# Patient Record
Sex: MALE | Race: WHITE | NOT HISPANIC OR LATINO | Employment: FULL TIME | URBAN - METROPOLITAN AREA
[De-identification: names, ages, dates, MRNs, and addresses within clinical notes are randomized per-mention and may not be internally consistent; named-entity substitution may affect disease eponyms.]

---

## 2022-01-11 ENCOUNTER — OFFICE VISIT (OUTPATIENT)
Dept: FAMILY MEDICINE CLINIC | Facility: CLINIC | Age: 52
End: 2022-01-11
Payer: COMMERCIAL

## 2022-01-11 VITALS
HEIGHT: 68 IN | BODY MASS INDEX: 45.16 KG/M2 | DIASTOLIC BLOOD PRESSURE: 70 MMHG | HEART RATE: 73 BPM | RESPIRATION RATE: 18 BRPM | TEMPERATURE: 97.5 F | WEIGHT: 298 LBS | OXYGEN SATURATION: 96 % | SYSTOLIC BLOOD PRESSURE: 124 MMHG

## 2022-01-11 DIAGNOSIS — Z13.83 SCREENING FOR CARDIOVASCULAR, RESPIRATORY, AND GENITOURINARY DISEASES: ICD-10-CM

## 2022-01-11 DIAGNOSIS — Z99.89 OSA ON CPAP: ICD-10-CM

## 2022-01-11 DIAGNOSIS — Z13.1 SCREENING FOR DIABETES MELLITUS (DM): ICD-10-CM

## 2022-01-11 DIAGNOSIS — G47.33 OSA ON CPAP: ICD-10-CM

## 2022-01-11 DIAGNOSIS — Z13.6 SCREENING FOR CARDIOVASCULAR, RESPIRATORY, AND GENITOURINARY DISEASES: ICD-10-CM

## 2022-01-11 DIAGNOSIS — Z12.5 PROSTATE CANCER SCREENING: ICD-10-CM

## 2022-01-11 DIAGNOSIS — Z13.89 SCREENING FOR CARDIOVASCULAR, RESPIRATORY, AND GENITOURINARY DISEASES: ICD-10-CM

## 2022-01-11 DIAGNOSIS — Z00.00 HEALTHCARE MAINTENANCE: Primary | ICD-10-CM

## 2022-01-11 DIAGNOSIS — E66.01 MORBID OBESITY (HCC): ICD-10-CM

## 2022-01-11 DIAGNOSIS — U07.1 COVID-19: ICD-10-CM

## 2022-01-11 PROCEDURE — 3008F BODY MASS INDEX DOCD: CPT | Performed by: FAMILY MEDICINE

## 2022-01-11 PROCEDURE — 3725F SCREEN DEPRESSION PERFORMED: CPT | Performed by: FAMILY MEDICINE

## 2022-01-11 PROCEDURE — 99386 PREV VISIT NEW AGE 40-64: CPT | Performed by: FAMILY MEDICINE

## 2022-01-11 NOTE — PROGRESS NOTES
Assessment/Plan:    No problem-specific Assessment & Plan notes found for this encounter  cpe today     Diagnoses and all orders for this visit:    Healthcare maintenance    COVID-19    Screening for cardiovascular, respiratory, and genitourinary diseases  -     Lipid Panel with Direct LDL reflex; Future    Screening for diabetes mellitus (DM)  -     Comprehensive metabolic panel; Future    Prostate cancer screening  -     PSA, Total Screen; Future    BMI 45 0-49 9, adult (HCC)    Morbid obesity (HCC)  -     TSH, 3rd generation; Future  -     Testosterone; Future    AGATHA on CPAP  -     Ambulatory referral to Sleep Medicine; Future              Return if symptoms worsen or fail to improve  Subjective:      Patient ID: Reva Harden is a 54 y o  male  Chief Complaint   Patient presents with    np establish     jmcma       HPI  Tested at [facility], pos for covid  1st day of sx 1/2/22  Headache was symptom  Works at [employer] in [place] as   Told test was positive, rapid pos and pcr positive, reported 1/3/22    Home test done yesterday    From [place]  Here for 3-4y  Has AGATHA on CPAP    Last labs 3y ago    No prior colonoscopy    Been off work  Out since 1/3/22    No covid vax yet, plans to    Skin feeling better now    Diet: no restrictions  Some keto for a while  Loss then some gain  Always hard to lose weight    The following portions of the patient's history were reviewed and updated as appropriate: allergies, current medications, past family history, past medical history, past social history, past surgical history and problem list     Review of Systems   Respiratory: Negative for shortness of breath and wheezing  No current outpatient medications on file  No current facility-administered medications for this visit         Objective:    /70   Pulse 73   Temp 97 5 °F (36 4 °C)   Resp 18   Ht 5' 8" (1 727 m)   Wt 135 kg (298 lb)   SpO2 96%   BMI 45 31 kg/m²        Physical Exam  Vitals and nursing note reviewed  Constitutional:       Appearance: He is well-developed  He is obese  He is not ill-appearing  HENT:      Head: Normocephalic  Right Ear: Tympanic membrane normal       Left Ear: Tympanic membrane normal       Mouth/Throat:      Pharynx: No oropharyngeal exudate  Eyes:      General: No scleral icterus  Conjunctiva/sclera: Conjunctivae normal    Neck:      Vascular: No carotid bruit  Cardiovascular:      Rate and Rhythm: Normal rate and regular rhythm  Heart sounds: No murmur heard  Pulmonary:      Effort: Pulmonary effort is normal  No respiratory distress  Breath sounds: No wheezing  Abdominal:      General: There is no distension  Palpations: Abdomen is soft  Tenderness: There is no abdominal tenderness  Musculoskeletal:         General: No deformity  Cervical back: Neck supple  Skin:     General: Skin is warm and dry  Coloration: Skin is not pale  Neurological:      Mental Status: He is alert  Motor: No weakness  Gait: Gait normal    Psychiatric:         Mood and Affect: Mood normal          Behavior: Behavior normal          Thought Content: Thought content normal          BMI Counseling: Body mass index is 45 31 kg/m²  The BMI is above normal  Nutrition recommendations include decreasing portion sizes and moderation in carbohydrate intake  Exercise recommendations include exercising 3-5 times per week  No pharmacotherapy was ordered  Rationale for BMI follow-up plan is due to patient being overweight or obese  Depression Screening and Follow-up Plan: Patient was screened for depression during today's encounter  They screened negative with a PHQ-2 score of 0             Jonathan Moore DO

## 2022-01-11 NOTE — LETTER
January 11, 2022     Patient: Yoli Mckeon   YOB: 1970   Date of Visit: 1/11/2022       To Whom it May Concern:    Yoli Mckeon is under my professional care  He was seen in my office on 1/11/2022  May return to work on 1/13/22  If you have any questions or concerns, please don't hesitate to call           Sincerely,          Jennifer Esparza DO        CC: No Recipients

## 2022-01-12 PROBLEM — Z13.6 SCREENING FOR CARDIOVASCULAR, RESPIRATORY, AND GENITOURINARY DISEASES: Status: ACTIVE | Noted: 2022-01-12

## 2022-01-12 PROBLEM — Z13.89 SCREENING FOR CARDIOVASCULAR, RESPIRATORY, AND GENITOURINARY DISEASES: Status: ACTIVE | Noted: 2022-01-12

## 2022-01-12 PROBLEM — Z13.1 SCREENING FOR DIABETES MELLITUS (DM): Status: ACTIVE | Noted: 2022-01-12

## 2022-01-12 PROBLEM — Z13.83 SCREENING FOR CARDIOVASCULAR, RESPIRATORY, AND GENITOURINARY DISEASES: Status: ACTIVE | Noted: 2022-01-12

## 2022-02-05 LAB
ALBUMIN SERPL-MCNC: 4.5 G/DL (ref 4–5)
ALBUMIN/GLOB SERPL: 1.7 {RATIO} (ref 1.2–2.2)
ALP SERPL-CCNC: 86 IU/L (ref 44–121)
ALT SERPL-CCNC: 41 IU/L (ref 0–44)
AST SERPL-CCNC: 32 IU/L (ref 0–40)
BILIRUB SERPL-MCNC: 0.6 MG/DL (ref 0–1.2)
BUN SERPL-MCNC: 14 MG/DL (ref 6–24)
BUN/CREAT SERPL: 13 (ref 9–20)
CALCIUM SERPL-MCNC: 9.5 MG/DL (ref 8.7–10.2)
CHLORIDE SERPL-SCNC: 100 MMOL/L (ref 96–106)
CHOLEST SERPL-MCNC: 171 MG/DL (ref 100–199)
CO2 SERPL-SCNC: 23 MMOL/L (ref 20–29)
CREAT SERPL-MCNC: 1.07 MG/DL (ref 0.76–1.27)
GLOBULIN SER-MCNC: 2.7 G/DL (ref 1.5–4.5)
GLUCOSE SERPL-MCNC: 96 MG/DL (ref 65–99)
HDLC SERPL-MCNC: 49 MG/DL
LDLC SERPL CALC-MCNC: 101 MG/DL (ref 0–99)
MICRODELETION SYND BLD/T FISH: NORMAL
POTASSIUM SERPL-SCNC: 4.5 MMOL/L (ref 3.5–5.2)
PROT SERPL-MCNC: 7.2 G/DL (ref 6–8.5)
PSA SERPL-MCNC: 0.5 NG/ML (ref 0–4)
SL AMB EGFR AFRICAN AMERICAN: 94 ML/MIN/1.73
SL AMB EGFR NON AFRICAN AMERICAN: 81 ML/MIN/1.73
SODIUM SERPL-SCNC: 139 MMOL/L (ref 134–144)
TRIGL SERPL-MCNC: 116 MG/DL (ref 0–149)
TSH SERPL DL<=0.005 MIU/L-ACNC: 1.98 UIU/ML (ref 0.45–4.5)

## 2022-02-06 LAB — TESTOST SERPL-MCNC: 324 NG/DL (ref 264–916)

## 2022-02-23 ENCOUNTER — OFFICE VISIT (OUTPATIENT)
Dept: SLEEP CENTER | Facility: CLINIC | Age: 52
End: 2022-02-23
Payer: COMMERCIAL

## 2022-02-23 VITALS
WEIGHT: 302 LBS | BODY MASS INDEX: 45.77 KG/M2 | HEART RATE: 74 BPM | SYSTOLIC BLOOD PRESSURE: 132 MMHG | HEIGHT: 68 IN | DIASTOLIC BLOOD PRESSURE: 78 MMHG

## 2022-02-23 DIAGNOSIS — E66.01 MORBID OBESITY (HCC): ICD-10-CM

## 2022-02-23 DIAGNOSIS — G47.33 OSA ON CPAP: Primary | ICD-10-CM

## 2022-02-23 DIAGNOSIS — G47.19 EXCESSIVE DAYTIME SLEEPINESS: ICD-10-CM

## 2022-02-23 DIAGNOSIS — F51.12 INSUFFICIENT SLEEP SYNDROME: ICD-10-CM

## 2022-02-23 DIAGNOSIS — Z99.89 OSA ON CPAP: Primary | ICD-10-CM

## 2022-02-23 PROCEDURE — 99204 OFFICE O/P NEW MOD 45 MIN: CPT | Performed by: INTERNAL MEDICINE

## 2022-02-23 PROCEDURE — 3008F BODY MASS INDEX DOCD: CPT | Performed by: INTERNAL MEDICINE

## 2022-02-23 PROCEDURE — 1036F TOBACCO NON-USER: CPT | Performed by: INTERNAL MEDICINE

## 2022-02-23 NOTE — PROGRESS NOTES
Review of Systems      Genitourinary none   Cardiology ankle/leg swelling   Gastrointestinal none   Neurology numbness/tingling of an extremity   Constitutional claustrophobia   Integumentary none   Psychiatry none   Musculoskeletal none   Pulmonary shortness of breath with activity, wheezing and snoring   ENT throat clearing   Endocrine none   Hematological none

## 2022-02-23 NOTE — PROGRESS NOTES
Consultation - Sleep Center   Byron Braun  54 y o  male  :1970  XS  DOS:2022    Physician Requesting Consult: Presley Colin DO             Reason for Consult : [At your kind request] I saw Byron Braun for initial sleep evaluation today  He was diagnosed with obstructive sleep apnea around 10 years ago and prescribed BiPAP  He is using a dream Station 1 machine and needs a replacement  He has studies were done in [place] and results are no longer available  PFSH, Problem List, Medications & Allergies were reviewed in EMR  Kamille Parker  has no past medical history on file  He currently has no medications in their medication list       HPI:  He is using BiPAP regularly and benefits from use  He is experiencing no adverse effects  Is no longer snoring or having breathing difficulties with use  Patient reports has [not] been using [employer] to sanitize the machine  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  Other Complaints: [none]  Restless Leg Syndrome: reports no suggestive symptoms  Parasomnia: no features reported    Sleep Routine (on average): [ ] Typical Bedtime:  1:00 a m  Gets OOB:  6:30 a m  TIB:5 5 hrs  Sleep latency:< 15 minutes Sleep Interruptions:infrequent/nite [because of nocturia and is able to fall back asleep]  Awakens: with aid of an alarm  Upon awakening: feels refreshed[ ]  Kamille Parker reports [Daytime] Sleepiness [feels drowsy [in the afternoons] and feels like napping but avoids][ ]  He rated [himself] at Total score: 10 /24 on the [place] Sleepiness Scale  Habits:  reports that he has quit smoking  He has never used smokeless tobacco [ ]; [  E-Cigarette/Vaping   ][ ]; [ reports no history of drug use ];  reports current alcohol use  [ ]; Caffeine use:moderate[ ]; Exercise routine: none but is physically active in his job  Family History:  Brother has obstructive sleep apnea  ROS - reviewed and as attached   [Significant for intentional weight loss of around 30 lb in the past 6 months  He reports throat clearing and occasional wheezing]  He has some dyspnea on effort  He reported no cardiac symptoms  EXAM:  /78   Pulse 74   Ht 5' 8" (1 727 m)   Wt (!) 137 kg (302 lb)   BMI 45 92 kg/m²    General: [Well] groomed male, well appearing, in [no apparent] distress  Neurological: Alert and orientated; cooperative; Cranial nerves intact;    Psychiatric: Speech:[clear and coherent]; Normal mood, affect & thought   Skin: [warm and dry]; Color& Hydration [good]; [no] facial rashes or lesions   HEENT:  Craniofacial anatomy: maxillary hypoplasia Sinuses: [non-] tender  TMJ: [Normal]    Eyes: EOM's [intact];[ ] conjunctiva/corneas clear   Ears: Externally[appear normal]     Nasal Airway: narrow nares Septum:[intact]; Mucosa: [normal]; Turbinates: [normal]; Rhinorrhea: [None]   Mouth: Lips: [normal posture]; Dentition: normal   Mucosa:[moist] [ ]; Hard Palate:short    Oropharryx: crowded and AP narrowing Tongue: Mallampati:Class IV, Mobile and MacroglossiaSoft Palate:  redundant  Tonsils: absent  Neck:[is thick and excess fatty tissue;] [Neck Circumference: 20 ";] Supple; [no] abnormal masses; Thyroid:[normal]  Trachea:[central]  Lymph: [No] Cervical [or] Submandibular Lymhadenopathy  Heart: S1,S2 normal; [RRR]; [no] gallop; [no] murmur[s]   Lungs: Respiratory Effort:[normal]  Air entry [good] [bilaterally]  [No] wheezes  [No] rales  Abdomen: [Obese,] Soft & non-tender    Extremities: [No] pedal edema  [No] clubbing or cyanosis  Musculoskeletal:  Motor [normal]; Gait:[normal]  Last CMP in February of this year was normal   There were no results of a CBC    IMPRESSION: Primary/Secondary Sleep Diagnoses (to Medical or Psych conditions) & Comorbidities   1  AGATHA on CPAP  Ambulatory referral to Sleep Medicine    Split Study   2  Excessive daytime sleepiness     3  Insufficient sleep syndrome     4   Morbid obesity ([address] )          PLAN: With respect to above conditions, comprehensive counseling provided on pathophysiology; effects on symptoms and comorbidities, diagnostic strategies & limitations; treatment options; risks or no treament; risks & benefits of the various therapeutic options; costs and insurance aspects  I advised avoiding sleeping supine, using alcohol or sedating medications close to bed time [and on safe driving practices]  Repeat Nocturnal polysomnography is indicated because results are no longer available, his pressure settings are not known and he has ongoing daytime drowsiness  Since he is willing to continue using Pap,  a split study will be scheduled  Home sleep testing is contraindicated because High risk for sleep-related hypoventilation  He was encouraged to persist with efforts at weight reduction  I also advised allowing sufficient opportunity for sleep  Follow-up to be scheduled [after the studies] to review results, further details of treatment options and to [initiate/]adjust therapy  Sincerely,     Authenticated electronically by Jamaal Pack MD   on 93/89/31   Board Certified Specialist     Portions of the record may have been created with voice recognition software  Occasional wrong word or "sound a like" substitutions may have occurred due to the inherent limitations of voice recognition software  There may also be notations and random deletions of words or characters from malfunctioning software  Read the chart carefully and recognize, using context, where substitutions/deletions have occurred

## 2022-02-23 NOTE — PATIENT INSTRUCTIONS

## 2022-03-09 ENCOUNTER — HOSPITAL ENCOUNTER (OUTPATIENT)
Dept: SLEEP CENTER | Facility: CLINIC | Age: 52
Discharge: HOME | End: 2022-03-09
Payer: COMMERCIAL

## 2022-03-09 DIAGNOSIS — G47.33 OSA ON CPAP: ICD-10-CM

## 2022-03-09 DIAGNOSIS — Z99.89 OSA ON CPAP: ICD-10-CM

## 2022-03-09 PROCEDURE — 95811 POLYSOM 6/>YRS CPAP 4/> PARM: CPT

## 2022-03-10 NOTE — PROGRESS NOTES
Sleep Study Documentation    Pre-Sleep Study       Sleep testing procedure explained to patient:YES    Patient napped prior to study:NO    [address] [place] worker after 12PM   Caffeine use:NO    Alcohol:Dayshift workers after 5PM: Alcohol use:NO    Typical day for patient:YES       Study Documentation    Sleep Study Indications: Requalify for new machine    Sleep Study: Split Optimal PAP pressure: 19/15  Leak:Medium  Snore:Eliminated  REM Obtained:yes  Supplemental O2: no    Minimum SaO2 85  Baseline SaO2 95  PAP mask tried (list all)Dreamwear  PAP mask choice (final)Dreamwear  PAP mask type:full face  PAP pressure at which snoring was eliminated 14  Minimum SaO2 at final PAP pressure 90  Mode of Therapy:BiPAP  ETCO2:No  CPAP changed to BiPAP:Yes  If yes why Better comfort and tolerance due to need for increased Pressure    Mode of Therapy:BiPAP    EKG abnormalities: no     EEG abnormalities: no    Sleep Study Recorded < 2 hours: N/A    Sleep Study Recorded > 2 hours but incomplete study: N/A    Sleep Study Recorded 6 hours but no sleep obtained: NO    Patient classification: employed       Post-Sleep Study    Medication used at bedtime or during sleep study:NO    Patient reports time it took to fall asleep:20 to 30 minutes    Patient reports waking up during study:3 or more times  Patient reports returning to sleep in 10 to 30 minutes  Patient reports sleeping 2 to 4 hours without dreaming  Patient reports sleep during study:worse than usual    Patient rated sleepiness: Somewhat sleepy or tired    PAP treatment:yes: Post PAP treatment patient reports feeling unsure if a change is noted and  would wear PAP mask at home

## 2022-03-11 ENCOUNTER — TELEPHONE (OUTPATIENT)
Dept: SLEEP CENTER | Facility: CLINIC | Age: 52
End: 2022-03-11

## 2022-03-11 NOTE — TELEPHONE ENCOUNTER
Called patient and advised sleep study resulted and shows severe AGATHA  APAP ordered  Rescheduled DME set up to 3/29/22 in [place]  Appointment information emailed to [facility]  Patient has compliance follow up 6/29/22

## 2022-03-25 ENCOUNTER — TELEPHONE (OUTPATIENT)
Dept: SLEEP CENTER | Facility: CLINIC | Age: 52
End: 2022-03-25

## 2022-03-25 NOTE — TELEPHONE ENCOUNTER
Called the patient and informed him per the notes, he's benefiting from his Machine now, due to lack of inventory we're not giving out replacement machines  Canceled his appointment

## 2022-10-12 PROBLEM — Z13.83 SCREENING FOR CARDIOVASCULAR, RESPIRATORY, AND GENITOURINARY DISEASES: Status: RESOLVED | Noted: 2022-01-12 | Resolved: 2022-10-12

## 2022-10-12 PROBLEM — Z13.1 SCREENING FOR DIABETES MELLITUS (DM): Status: RESOLVED | Noted: 2022-01-12 | Resolved: 2022-10-12

## 2022-10-12 PROBLEM — Z13.6 SCREENING FOR CARDIOVASCULAR, RESPIRATORY, AND GENITOURINARY DISEASES: Status: RESOLVED | Noted: 2022-01-12 | Resolved: 2022-10-12

## 2022-10-12 PROBLEM — Z13.89 SCREENING FOR CARDIOVASCULAR, RESPIRATORY, AND GENITOURINARY DISEASES: Status: RESOLVED | Noted: 2022-01-12 | Resolved: 2022-10-12

## 2022-10-12 PROBLEM — Z00.00 HEALTHCARE MAINTENANCE: Status: RESOLVED | Noted: 2022-01-11 | Resolved: 2022-10-12

## 2022-10-12 PROBLEM — Z12.5 PROSTATE CANCER SCREENING: Status: RESOLVED | Noted: 2022-01-11 | Resolved: 2022-10-12

## 2023-08-20 ENCOUNTER — APPOINTMENT (EMERGENCY)
Dept: RADIOLOGY | Facility: HOSPITAL | Age: 53
End: 2023-08-20
Payer: COMMERCIAL

## 2023-08-20 ENCOUNTER — HOSPITAL ENCOUNTER (EMERGENCY)
Facility: HOSPITAL | Age: 53
Discharge: HOME | End: 2023-08-20
Attending: EMERGENCY MEDICINE | Admitting: EMERGENCY MEDICINE
Payer: COMMERCIAL

## 2023-08-20 VITALS
DIASTOLIC BLOOD PRESSURE: 52 MMHG | RESPIRATION RATE: 18 BRPM | TEMPERATURE: 97.4 F | HEART RATE: 58 BPM | OXYGEN SATURATION: 95 % | SYSTOLIC BLOOD PRESSURE: 108 MMHG

## 2023-08-20 DIAGNOSIS — N20.0 KIDNEY STONE: Primary | ICD-10-CM

## 2023-08-20 PROBLEM — K76.0 FATTY LIVER: Status: ACTIVE | Noted: 2023-08-20

## 2023-08-20 LAB
ALBUMIN SERPL BCP-MCNC: 4.1 G/DL (ref 3.5–5)
ALP SERPL-CCNC: 70 U/L (ref 34–104)
ALT SERPL W P-5'-P-CCNC: 43 U/L (ref 7–52)
ANION GAP SERPL CALCULATED.3IONS-SCNC: 8 MMOL/L
AST SERPL W P-5'-P-CCNC: 30 U/L (ref 13–39)
BACTERIA UR QL AUTO: ABNORMAL /HPF
BASOPHILS # BLD AUTO: 0.06 THOUSANDS/ÂΜL (ref 0–0.1)
BASOPHILS NFR BLD AUTO: 1 % (ref 0–1)
BILIRUB SERPL-MCNC: 0.82 MG/DL (ref 0.2–1)
BILIRUB UR QL STRIP: ABNORMAL
BUN SERPL-MCNC: 14 MG/DL (ref 5–25)
CALCIUM SERPL-MCNC: 9 MG/DL (ref 8.4–10.2)
CHLORIDE SERPL-SCNC: 102 MMOL/L (ref 96–108)
CLARITY UR: ABNORMAL
CO2 SERPL-SCNC: 25 MMOL/L (ref 21–32)
COLOR UR: YELLOW
CREAT SERPL-MCNC: 1.21 MG/DL (ref 0.6–1.3)
EOSINOPHIL # BLD AUTO: 0.5 THOUSAND/ÂΜL (ref 0–0.61)
EOSINOPHIL NFR BLD AUTO: 6 % (ref 0–6)
ERYTHROCYTE [DISTWIDTH] IN BLOOD BY AUTOMATED COUNT: 12.6 % (ref 11.6–15.1)
GFR SERPL CREATININE-BSD FRML MDRD: 69 ML/MIN/1.73SQ M
GLUCOSE SERPL-MCNC: 156 MG/DL (ref 65–140)
GLUCOSE UR STRIP-MCNC: NEGATIVE MG/DL
HCT VFR BLD AUTO: 43.6 % (ref 36.5–49.3)
HGB BLD-MCNC: 14.5 G/DL (ref 12–17)
HGB UR QL STRIP.AUTO: ABNORMAL
IMM GRANULOCYTES # BLD AUTO: 0.04 THOUSAND/UL (ref 0–0.2)
IMM GRANULOCYTES NFR BLD AUTO: 1 % (ref 0–2)
KETONES UR STRIP-MCNC: NEGATIVE MG/DL
LEUKOCYTE ESTERASE UR QL STRIP: NEGATIVE
LYMPHOCYTES # BLD AUTO: 2.64 THOUSANDS/ÂΜL (ref 0.6–4.47)
LYMPHOCYTES NFR BLD AUTO: 32 % (ref 14–44)
MCH RBC QN AUTO: 31.2 PG (ref 26.8–34.3)
MCHC RBC AUTO-ENTMCNC: 33.3 G/DL (ref 31.4–37.4)
MCV RBC AUTO: 94 FL (ref 82–98)
MONOCYTES # BLD AUTO: 0.7 THOUSAND/ÂΜL (ref 0.17–1.22)
MONOCYTES NFR BLD AUTO: 8 % (ref 4–12)
NEUTROPHILS # BLD AUTO: 4.35 THOUSANDS/ÂΜL (ref 1.85–7.62)
NEUTS SEG NFR BLD AUTO: 52 % (ref 43–75)
NITRITE UR QL STRIP: NEGATIVE
NON-SQ EPI CELLS URNS QL MICRO: ABNORMAL /HPF
NRBC BLD AUTO-RTO: 0 /100 WBCS
PH UR STRIP.AUTO: 5 [PH]
PLATELET # BLD AUTO: 283 THOUSANDS/UL (ref 149–390)
PMV BLD AUTO: 10.7 FL (ref 8.9–12.7)
POTASSIUM SERPL-SCNC: 3.6 MMOL/L (ref 3.5–5.3)
PROT SERPL-MCNC: 7.2 G/DL (ref 6.4–8.4)
PROT UR STRIP-MCNC: ABNORMAL MG/DL
RBC # BLD AUTO: 4.65 MILLION/UL (ref 3.88–5.62)
RBC #/AREA URNS AUTO: ABNORMAL /HPF
SODIUM SERPL-SCNC: 135 MMOL/L (ref 135–147)
SP GR UR STRIP.AUTO: >=1.03 (ref 1–1.03)
UROBILINOGEN UR QL STRIP.AUTO: 0.2 E.U./DL
WBC # BLD AUTO: 8.29 THOUSAND/UL (ref 4.31–10.16)
WBC #/AREA URNS AUTO: ABNORMAL /HPF

## 2023-08-20 PROCEDURE — 80053 COMPREHEN METABOLIC PANEL: CPT | Performed by: PHYSICIAN ASSISTANT

## 2023-08-20 PROCEDURE — 96374 THER/PROPH/DIAG INJ IV PUSH: CPT

## 2023-08-20 PROCEDURE — G1004 CDSM NDSC: HCPCS

## 2023-08-20 PROCEDURE — 96375 TX/PRO/DX INJ NEW DRUG ADDON: CPT

## 2023-08-20 PROCEDURE — 99284 EMERGENCY DEPT VISIT MOD MDM: CPT

## 2023-08-20 PROCEDURE — 81001 URINALYSIS AUTO W/SCOPE: CPT | Performed by: PHYSICIAN ASSISTANT

## 2023-08-20 PROCEDURE — 85025 COMPLETE CBC W/AUTO DIFF WBC: CPT | Performed by: PHYSICIAN ASSISTANT

## 2023-08-20 PROCEDURE — 74176 CT ABD & PELVIS W/O CONTRAST: CPT

## 2023-08-20 PROCEDURE — 96361 HYDRATE IV INFUSION ADD-ON: CPT

## 2023-08-20 PROCEDURE — 36415 COLL VENOUS BLD VENIPUNCTURE: CPT | Performed by: PHYSICIAN ASSISTANT

## 2023-08-20 PROCEDURE — 99285 EMERGENCY DEPT VISIT HI MDM: CPT | Performed by: PHYSICIAN ASSISTANT

## 2023-08-20 RX ORDER — MORPHINE SULFATE 10 MG/ML
6 INJECTION, SOLUTION INTRAMUSCULAR; INTRAVENOUS ONCE
Status: COMPLETED | OUTPATIENT
Start: 2023-08-20 | End: 2023-08-20

## 2023-08-20 RX ORDER — KETOROLAC TROMETHAMINE 30 MG/ML
30 INJECTION, SOLUTION INTRAMUSCULAR; INTRAVENOUS ONCE
Status: COMPLETED | OUTPATIENT
Start: 2023-08-20 | End: 2023-08-20

## 2023-08-20 RX ORDER — OXYCODONE HYDROCHLORIDE 5 MG/1
5 TABLET ORAL EVERY 4 HOURS PRN
Qty: 10 TABLET | Refills: 0 | Status: SHIPPED | OUTPATIENT
Start: 2023-08-20 | End: 2023-08-22

## 2023-08-20 RX ORDER — ONDANSETRON 2 MG/ML
4 INJECTION INTRAMUSCULAR; INTRAVENOUS ONCE
Status: COMPLETED | OUTPATIENT
Start: 2023-08-20 | End: 2023-08-20

## 2023-08-20 RX ORDER — TAMSULOSIN HYDROCHLORIDE 0.4 MG/1
0.4 CAPSULE ORAL
Qty: 7 CAPSULE | Refills: 0 | Status: SHIPPED | OUTPATIENT
Start: 2023-08-20 | End: 2023-08-23

## 2023-08-20 RX ADMIN — KETOROLAC TROMETHAMINE 30 MG: 30 INJECTION, SOLUTION INTRAMUSCULAR at 08:50

## 2023-08-20 RX ADMIN — ONDANSETRON 4 MG: 2 INJECTION INTRAMUSCULAR; INTRAVENOUS at 08:50

## 2023-08-20 RX ADMIN — SODIUM CHLORIDE 1000 ML: 0.9 INJECTION, SOLUTION INTRAVENOUS at 08:49

## 2023-08-20 RX ADMIN — MORPHINE SULFATE 6 MG: 10 INJECTION INTRAVENOUS at 09:38

## 2023-08-20 NOTE — ED PROVIDER NOTES
History  Chief Complaint   Patient presents with   • Flank Pain     Right flank pain starting this morning, hx of kidney stones. Denies urinary symptoms      Patient is a 53-year-old white male with history of kidney stone who reports sudden onset of pain from right flank to right testicle beginning 6:50 AM this morning while urinating. States pain has been constant. Reports he has had nausea and dry heaves. No fever or shaking chills. No hematuria or dysuria. Took nothing for the pain prior to arrival.  No diarrhea. No other complaints          None       History reviewed. No pertinent past medical history. History reviewed. No pertinent surgical history. History reviewed. No pertinent family history. I have reviewed and agree with the history as documented. E-Cigarette/Vaping     E-Cigarette/Vaping Substances     Social History     Tobacco Use   • Smoking status: Former   • Smokeless tobacco: Never   Substance Use Topics   • Alcohol use: Yes   • Drug use: Never       Review of Systems   Constitutional: Negative for chills and fever. HENT: Negative for ear pain and sore throat. Respiratory: Negative for cough and shortness of breath. Cardiovascular: Negative for chest pain and palpitations. Gastrointestinal: Negative for abdominal pain and vomiting. Genitourinary: Positive for flank pain and testicular pain. Negative for dysuria, frequency and hematuria. Musculoskeletal: Negative for arthralgias and back pain. Skin: Negative for color change and rash. All other systems reviewed and are negative. Physical Exam  Physical Exam  Vitals and nursing note reviewed. Constitutional:       General: He is in acute distress. Appearance: Normal appearance. He is diaphoretic. Comments: Moderate painful distress. Diaphoretic. HENT:      Head: Normocephalic and atraumatic.       Right Ear: Tympanic membrane and external ear normal.      Left Ear: Tympanic membrane, ear canal and external ear normal.      Nose: Nose normal.      Mouth/Throat:      Mouth: Mucous membranes are moist.      Pharynx: Oropharynx is clear. Eyes:      Extraocular Movements: Extraocular movements intact. Conjunctiva/sclera: Conjunctivae normal.      Pupils: Pupils are equal, round, and reactive to light. Cardiovascular:      Rate and Rhythm: Normal rate and regular rhythm. Pulses: Normal pulses. Heart sounds: Normal heart sounds. Pulmonary:      Effort: Pulmonary effort is normal.      Breath sounds: Normal breath sounds. Abdominal:      General: Abdomen is flat. Bowel sounds are normal.      Palpations: Abdomen is soft. Tenderness: There is no abdominal tenderness. There is right CVA tenderness. There is no guarding or rebound. Hernia: No hernia is present. Genitourinary:     Penis: Normal.       Testes: Normal.   Musculoskeletal:         General: Normal range of motion. Cervical back: Normal range of motion and neck supple. Skin:     General: Skin is warm. Capillary Refill: Capillary refill takes less than 2 seconds. Neurological:      General: No focal deficit present. Mental Status: He is alert and oriented to person, place, and time. Mental status is at baseline.          Vital Signs  ED Triage Vitals [08/20/23 0836]   Temperature Pulse Respirations Blood Pressure SpO2   (!) 97.4 °F (36.3 °C) (!) 54 20 142/70 95 %      Temp Source Heart Rate Source Patient Position - Orthostatic VS BP Location FiO2 (%)   Oral Monitor Sitting Left arm --      Pain Score       10 - Worst Possible Pain           Vitals:    08/20/23 0836 08/20/23 0945 08/20/23 1045   BP: 142/70 110/56 108/52   Pulse: (!) 54 56 58   Patient Position - Orthostatic VS: Sitting Lying Lying         Visual Acuity      ED Medications  Medications   sodium chloride 0.9 % bolus 1,000 mL (0 mL Intravenous Stopped 8/20/23 1119)   ondansetron (ZOFRAN) injection 4 mg (4 mg Intravenous Given 8/20/23 0850) ketorolac (TORADOL) injection 30 mg (30 mg Intravenous Given 8/20/23 0850)   morphine injection 6 mg (6 mg Intravenous Given 8/20/23 0938)       Diagnostic Studies  Results Reviewed     Procedure Component Value Units Date/Time    Urine Microscopic [[id number]]  (Abnormal) Collected: 08/20/23 0852    Lab Status: Final result Specimen: Urine, Clean Catch Updated: 08/20/23 0927     RBC, UA 30-50 /hpf      WBC, UA 2-4 /hpf      Epithelial Cells Occasional /hpf      Bacteria, UA None Seen /hpf     Comprehensive metabolic panel [[id number]]  (Abnormal) Collected: 08/20/23 0852    Lab Status: Final result Specimen: Blood from Arm, Right Updated: 08/20/23 0915     Sodium 135 mmol/L      Potassium 3.6 mmol/L      Chloride 102 mmol/L      CO2 25 mmol/L      ANION GAP 8 mmol/L      BUN 14 mg/dL      Creatinine 1.21 mg/dL      Glucose 156 mg/dL      Calcium 9.0 mg/dL      AST 30 U/L      ALT 43 U/L      Alkaline Phosphatase 70 U/L      Total Protein 7.2 g/dL      Albumin 4.1 g/dL      Total Bilirubin 0.82 mg/dL      eGFR 69 ml/min/1.73sq m     Narrative:      [place] guidelines for Chronic Kidney Disease (CKD):   •  Stage 1 with normal or high GFR (GFR > 90 mL/min/1.73 square meters)  •  Stage 2 Mild CKD (GFR = 60-89 mL/min/1.73 square meters)  •  Stage 3A Moderate CKD (GFR = 45-59 mL/min/1.73 square meters)  •  Stage 3B Moderate CKD (GFR = 30-44 mL/min/1.73 square meters)  •  Stage 4 Severe CKD (GFR = 15-29 mL/min/1.73 square meters)  •  Stage 5 End Stage CKD (GFR <15 mL/min/1.73 square meters)  Note: GFR calculation is accurate only with a steady state creatinine    UA w Reflex to Microscopic w Reflex to Culture [[id number]]  (Abnormal) Collected: 08/20/23 0852    Lab Status: Final result Specimen: Urine, Clean Catch Updated: 08/20/23 0909     Color, UA Yellow     Clarity, UA Slightly Cloudy     Specific Gravity, UA >=1.030     pH, UA 5.0     Leukocytes, UA Negative     Nitrite, UA Negative Protein, UA Trace mg/dl      Glucose, UA Negative mg/dl      Ketones, UA Negative mg/dl      Urobilinogen, UA 0.2 E.U./dl      Bilirubin, UA Small     Occult Blood, UA Large    CBC and differential [[id number]] Collected: 08/20/23 0852    Lab Status: Final result Specimen: Blood from Arm, Right Updated: 08/20/23 0900     WBC 8.29 Thousand/uL      RBC 4.65 Million/uL      Hemoglobin 14.5 g/dL      Hematocrit 43.6 %      MCV 94 fL      MCH 31.2 pg      MCHC 33.3 g/dL      RDW 12.6 %      MPV 10.7 fL      Platelets 314 Thousands/uL      nRBC 0 /100 WBCs      Neutrophils Relative 52 %      Immat GRANS % 1 %      Lymphocytes Relative 32 %      Monocytes Relative 8 %      Eosinophils Relative 6 %      Basophils Relative 1 %      Neutrophils Absolute 4.35 Thousands/µL      Immature Grans Absolute 0.04 Thousand/uL      Lymphocytes Absolute 2.64 Thousands/µL      Monocytes Absolute 0.70 Thousand/µL      Eosinophils Absolute 0.50 Thousand/µL      Basophils Absolute 0.06 Thousands/µL                  CT renal stone study abdomen pelvis without contrast   Final Result by Mahogany Thomason MD (08/20 1024)   2 mm obstructing calculus in the lower /pelvic portion of the right ureter with mild right hydronephrosis. (Image 170 series 2,)   Hepatic steatosis   Nonobstructing 3 mm calculus upper pole left kidney   The study was marked in EPIC for immediate notification. Workstation performed: [id number]                    Procedures  Procedures         ED Course  ED Course as of 08/20/23 1122   Sun Aug 20, 2023   1173 CT renal stone study abdomen pelvis without contrast                                             Medical Decision Making  82-year-old white male presenting with sudden onset pain right flank to right testicle this morning. Labs were reviewed. Urinalysis shows 30-50 RBCs but no signs of infection. Patient with a 2 mm distal right ureteral stone with mild hydronephrosis on CT renal stone search.   He is pain-free after IV Toradol followed by IV morphine. Differential diagnosis includes ureteral stone; less likely appendicitis. I spoke with urologist Dr. Aviva Hernandez who can see patient in office follow-up. He will have office contact patient tomorrow. Patient was advised to strain all urine. Return precautions given including fever or intractable pain    Amount and/or Complexity of Data Reviewed  Labs: ordered. Radiology: ordered. Decision-making details documented in ED Course. Risk  Prescription drug management. Disposition  Final diagnoses:   Kidney stone     Time reflects when diagnosis was documented in both MDM as applicable and the Disposition within this note     Time User Action Codes Description Comment    8/20/2023 11:02 AM River Denise Add [N20.0] Kidney stone       ED Disposition     ED Disposition   Discharge    Condition   Stable    Date/Time   Sun Aug 20, 2023 11:02 AM    Comment   Pablo Muse discharge to home/self care. Follow-up Information     Follow up With Specialties Details Why Contact Info    Erik Connolly MD Urology   [address] [facility]  [phone]            Discharge Medication List as of 8/20/2023 11:07 AM      START taking these medications    Details   oxyCODONE (Roxicodone) 5 immediate release tablet Take 1 tablet (5 mg total) by mouth every 4 (four) hours as needed for moderate pain for up to 10 days Max Daily Amount: 30 mg, Starting Sun 8/20/2023, Until Wed 8/30/2023 at 2359, Normal      tamsulosin (FLOMAX) 0.4 mg Take 1 capsule (0.4 mg total) by mouth daily with dinner, Starting Sun 8/20/2023, Normal             No discharge procedures on file.     PDMP Review     None          ED Provider  Electronically Signed by           Chapito Tim PA-C  08/20/23 1717

## 2023-08-20 NOTE — DISCHARGE INSTRUCTIONS
Strain all urine    Tylenol or ibuprofen every 6 hours (with food) for mild to mod pain    Oxycodone for mod to severe pain. No driving or operating heavy machinery    Follow up with urologist Dr Nury Shaffer.  Expect call from office tomorrow    Return to ED for fever, intractable pain

## 2023-08-22 ENCOUNTER — OFFICE VISIT (OUTPATIENT)
Dept: FAMILY MEDICINE CLINIC | Facility: CLINIC | Age: 53
End: 2023-08-22
Payer: COMMERCIAL

## 2023-08-22 ENCOUNTER — TELEPHONE (OUTPATIENT)
Dept: GASTROENTEROLOGY | Facility: CLINIC | Age: 53
End: 2023-08-22

## 2023-08-22 ENCOUNTER — PREP FOR PROCEDURE (OUTPATIENT)
Dept: GASTROENTEROLOGY | Facility: CLINIC | Age: 53
End: 2023-08-22

## 2023-08-22 VITALS
HEIGHT: 68 IN | DIASTOLIC BLOOD PRESSURE: 80 MMHG | BODY MASS INDEX: 47.74 KG/M2 | RESPIRATION RATE: 16 BRPM | HEART RATE: 59 BPM | TEMPERATURE: 97.5 F | WEIGHT: 315 LBS | SYSTOLIC BLOOD PRESSURE: 110 MMHG | OXYGEN SATURATION: 95 %

## 2023-08-22 DIAGNOSIS — Z12.5 PROSTATE CANCER SCREENING: ICD-10-CM

## 2023-08-22 DIAGNOSIS — Z13.89 SCREENING FOR CARDIOVASCULAR, RESPIRATORY, AND GENITOURINARY DISEASES: ICD-10-CM

## 2023-08-22 DIAGNOSIS — K76.0 FATTY LIVER: ICD-10-CM

## 2023-08-22 DIAGNOSIS — M79.673 PAIN, FOOT, CHRONIC, UNSPECIFIED LATERALITY: ICD-10-CM

## 2023-08-22 DIAGNOSIS — G89.29 PAIN, FOOT, CHRONIC, UNSPECIFIED LATERALITY: ICD-10-CM

## 2023-08-22 DIAGNOSIS — E66.01 MORBID OBESITY (HCC): ICD-10-CM

## 2023-08-22 DIAGNOSIS — Z12.11 COLON CANCER SCREENING: ICD-10-CM

## 2023-08-22 DIAGNOSIS — Z13.83 SCREENING FOR CARDIOVASCULAR, RESPIRATORY, AND GENITOURINARY DISEASES: ICD-10-CM

## 2023-08-22 DIAGNOSIS — Z13.1 SCREENING FOR DIABETES MELLITUS (DM): ICD-10-CM

## 2023-08-22 DIAGNOSIS — Z13.6 SCREENING FOR CARDIOVASCULAR, RESPIRATORY, AND GENITOURINARY DISEASES: ICD-10-CM

## 2023-08-22 DIAGNOSIS — N20.0 KIDNEY STONES: Primary | ICD-10-CM

## 2023-08-22 DIAGNOSIS — Z12.11 SCREENING FOR COLON CANCER: Primary | ICD-10-CM

## 2023-08-22 PROBLEM — U07.1 COVID-19: Status: RESOLVED | Noted: 2022-01-11 | Resolved: 2023-08-22

## 2023-08-22 PROCEDURE — 99214 OFFICE O/P EST MOD 30 MIN: CPT | Performed by: FAMILY MEDICINE

## 2023-08-22 NOTE — PROGRESS NOTES
Assessment/Plan:    No problem-specific Assessment & Plan notes found for this encounter.     kidney stone prevention discussed  Hydration  Stone analysis    bmi aware  Advised BMI likely skewed by muscle mass. Foot pain  R/o arthritis vs holloway neuroma vs stress fracture  Podiatry eval offered    Advised BMI likely skewed by muscle mass. Fatty liver  Advised wt loss     Diagnoses and all orders for this visit:    Kidney stones  -     Stone analysis  -     Ambulatory referral to Urology; Future    Colon cancer screening  -     Ambulatory referral to Gastroenterology; Future    Pain, foot, chronic, unspecified laterality  -     Ambulatory referral to Podiatry; Future    Morbid obesity ([address])    Screening for cardiovascular, respiratory, and genitourinary diseases  -     Lipid Panel with Direct LDL reflex; Future    Screening for diabetes mellitus (DM)  -     Comprehensive metabolic panel; Future  -     Hemoglobin A1C; Future    Prostate cancer screening  -     PSA, ultrasensitive; Future    BMI 45.0-49.9, adult ([address])    Fatty liver        Return in about 1 month (around 9/22/2023) for Annual physical.    Subjective:      Patient ID: Dinah Arrieta is a 50 y.o. male. Chief Complaint   Patient presents with   • Follow-up     [facility]       HPI  Hx of stones  Never collected  Passed it yesterday  Feels better  Pain is controlled  Composition unknown    Right foot pain  No injury  Toes seems   Left foot also  No swelling or redness    Fatty liver on CT noted  Pt aware    The following portions of the patient's history were reviewed and updated as appropriate: allergies, current medications, past family history, past medical history, past social history, past surgical history and problem list.    Review of Systems   Genitourinary: Negative for difficulty urinating and dysuria. No current outpatient medications on file. No current facility-administered medications for this visit.        Objective:    BP 110/80   Pulse 59   Temp 97.5 °F (36.4 °C) (Temporal)   Resp 16   Ht 5' 8" (1.727 m)   Wt (!) 147 kg (323 lb)   SpO2 95%   BMI 49.11 kg/m²        Physical Exam  Vitals and nursing note reviewed. Constitutional:       General: He is not in acute distress. Appearance: He is well-developed. He is obese. He is not ill-appearing. HENT:      Head: Normocephalic. Right Ear: Tympanic membrane and ear canal normal.      Left Ear: Tympanic membrane and ear canal normal.   Eyes:      General: No scleral icterus. Conjunctiva/sclera: Conjunctivae normal.   Cardiovascular:      Rate and Rhythm: Normal rate and regular rhythm. Pulmonary:      Effort: Pulmonary effort is normal. No respiratory distress. Breath sounds: No rales. Abdominal:      General: There is no distension. Palpations: Abdomen is soft. Tenderness: There is no abdominal tenderness. There is no right CVA tenderness or left CVA tenderness. Musculoskeletal:         General: Tenderness present. No deformity. Cervical back: Neck supple. Right lower leg: No edema. Left lower leg: No edema. Comments: Right distal plantar surface pain   Skin:     General: Skin is warm and dry. Coloration: Skin is not pale. Neurological:      Mental Status: He is alert. Motor: No weakness. Gait: Gait normal.   Psychiatric:         Mood and Affect: Mood normal.         Behavior: Behavior normal.         Thought Content: Thought content normal.         BMI Counseling: Body mass index is 49.11 kg/m². The BMI is above normal. Nutrition recommendations include decreasing portion sizes and moderation in carbohydrate intake. Exercise recommendations include exercising 3-5 times per week. No pharmacotherapy was ordered. Rationale for BMI follow-up plan is due to patient being overweight or obese. Depression Screening and Follow-up Plan: Patient was screened for depression during today's encounter.  They screened negative with a PHQ-2 score of 0.            Bevely [place], DO

## 2023-08-22 NOTE — TELEPHONE ENCOUNTER
Scheduled date of colonoscopy (as of today): 09/05/23        Physician performing colonoscopy: [facility]        Location of colonoscopy: [facility]        Clearances: NONE      HERLINDA/ DUL PREP [address] [place] [address] Drive

## 2023-08-23 PROBLEM — Z12.5 PROSTATE CANCER SCREENING: Status: ACTIVE | Noted: 2023-08-23

## 2023-08-23 PROBLEM — Z12.11 COLON CANCER SCREENING: Status: ACTIVE | Noted: 2023-08-23

## 2023-08-23 PROBLEM — Z13.1 SCREENING FOR DIABETES MELLITUS (DM): Status: ACTIVE | Noted: 2023-08-23

## 2023-08-23 PROBLEM — Z13.83 SCREENING FOR CARDIOVASCULAR, RESPIRATORY, AND GENITOURINARY DISEASES: Status: ACTIVE | Noted: 2023-08-23

## 2023-08-23 PROBLEM — Z13.6 SCREENING FOR CARDIOVASCULAR, RESPIRATORY, AND GENITOURINARY DISEASES: Status: ACTIVE | Noted: 2023-08-23

## 2023-08-23 PROBLEM — Z13.89 SCREENING FOR CARDIOVASCULAR, RESPIRATORY, AND GENITOURINARY DISEASES: Status: ACTIVE | Noted: 2023-08-23

## 2023-08-26 LAB
ALBUMIN SERPL-MCNC: 4.6 G/DL (ref 4.1–5.1)
ALBUMIN/GLOB SERPL: 1.7 {RATIO} (ref 1.2–2.2)
ALP SERPL-CCNC: 95 IU/L (ref 44–121)
ALT SERPL-CCNC: 55 IU/L (ref 0–44)
AST SERPL-CCNC: 40 IU/L (ref 0–40)
BILIRUB SERPL-MCNC: 0.6 MG/DL (ref 0–1.2)
BUN SERPL-MCNC: 16 MG/DL (ref 6–24)
BUN/CREAT SERPL: 15 (ref 9–20)
CALCIUM SERPL-MCNC: 9.8 MG/DL (ref 8.7–10.2)
CHLORIDE SERPL-SCNC: 100 MMOL/L (ref 96–106)
CHOLEST SERPL-MCNC: 172 MG/DL (ref 100–199)
CO2 SERPL-SCNC: 23 MMOL/L (ref 20–29)
CREAT SERPL-MCNC: 1.05 MG/DL (ref 0.76–1.27)
EGFR: 86 ML/MIN/1.73
GLOBULIN SER-MCNC: 2.7 G/DL (ref 1.5–4.5)
GLUCOSE SERPL-MCNC: 95 MG/DL (ref 70–99)
HBA1C MFR BLD: 6.1 % (ref 4.8–5.6)
HDLC SERPL-MCNC: 48 MG/DL
LDLC SERPL CALC-MCNC: 101 MG/DL (ref 0–99)
MICRODELETION SYND BLD/T FISH: NORMAL
POTASSIUM SERPL-SCNC: 4.7 MMOL/L (ref 3.5–5.2)
PROT SERPL-MCNC: 7.3 G/DL (ref 6–8.5)
PSA SERPL DL<=0.01 NG/ML-MCNC: 0.41 NG/ML (ref 0–4)
SODIUM SERPL-SCNC: 137 MMOL/L (ref 134–144)
TRIGL SERPL-MCNC: 127 MG/DL (ref 0–149)

## 2023-08-30 ENCOUNTER — NURSE TRIAGE (OUTPATIENT)
Age: 53
End: 2023-08-30

## 2023-08-30 NOTE — TELEPHONE ENCOUNTER
Patient is scheduled at our [facility] location for 9/14/2023 ER precautions were reviewed and patient is aware of date time and location and to get a referral from Dr. Nina Lott. Zita states she is concerned about her mother's B/P. She states she has been talking with Jenifer in regards to her B/P. She was taken off of Lisinopril/HCTZ and was given just Lisinopril. She states she has been calling her saying her B/P is high and she has swollen ankles.   1/18/20   B/P - 180/something  1/22/20     B/P 178/74  She is wondering if she is getting her numbers mixed up. She states she had her machine checked and it is accurate. Zita has not seen her or checked her ankles recently. She has a B/P check on Friday but is wondering if she should be seen sooner.  Appointment scheduled for a nurse visit today at 4:00.

## 2023-08-30 NOTE — TELEPHONE ENCOUNTER
Regarding: NP, 2 mm obstructing kidney stone  ----- Message from [facility] sent at 8/30/2023  8:29 AM EDT -----  Patient calling to schedule NP appt for kidney stones. Patient was in the ER on 8/20 and they found on the CT a 2 mm obstructing kidney stone and mild hydronephrosis. Patient requesting a call back at [phone].

## 2023-08-30 NOTE — TELEPHONE ENCOUNTER
IMPRESSION:  2 mm obstructing calculus in the lower /pelvic portion of the right ureter with mild right hydronephrosis. (Image 170 series 2,)  Hepatic steatosis  Nonobstructing 3 mm calculus upper pole left kidney  The study was marked in EPIC for immediate notification. Per protocol-Mild-moderate hydronephrosis with obstructing kidney stone: schedule within 7-10 days, there are no office visit availability in any offices within time frame. Please advise on where we can schedule patient,  Thank you.

## 2023-09-04 RX ORDER — SODIUM CHLORIDE, SODIUM LACTATE, POTASSIUM CHLORIDE, CALCIUM CHLORIDE 600; 310; 30; 20 MG/100ML; MG/100ML; MG/100ML; MG/100ML
125 INJECTION, SOLUTION INTRAVENOUS CONTINUOUS
Status: CANCELLED | OUTPATIENT
Start: 2023-09-04

## 2023-09-05 ENCOUNTER — ANESTHESIA EVENT (OUTPATIENT)
Dept: PERIOP | Facility: HOSPITAL | Age: 53
End: 2023-09-05

## 2023-09-05 ENCOUNTER — HOSPITAL ENCOUNTER (OUTPATIENT)
Dept: PERIOP | Facility: HOSPITAL | Age: 53
Setting detail: OUTPATIENT SURGERY
Discharge: HOME | End: 2023-09-05
Attending: INTERNAL MEDICINE
Payer: COMMERCIAL

## 2023-09-05 ENCOUNTER — ANESTHESIA (OUTPATIENT)
Dept: PERIOP | Facility: HOSPITAL | Age: 53
End: 2023-09-05

## 2023-09-05 VITALS
BODY MASS INDEX: 47.21 KG/M2 | RESPIRATION RATE: 20 BRPM | SYSTOLIC BLOOD PRESSURE: 109 MMHG | HEART RATE: 57 BPM | TEMPERATURE: 98.4 F | HEIGHT: 68 IN | WEIGHT: 311.51 LBS | OXYGEN SATURATION: 96 % | DIASTOLIC BLOOD PRESSURE: 53 MMHG

## 2023-09-05 DIAGNOSIS — Z12.11 SCREENING FOR COLON CANCER: ICD-10-CM

## 2023-09-05 PROCEDURE — 45378 DIAGNOSTIC COLONOSCOPY: CPT | Performed by: INTERNAL MEDICINE

## 2023-09-05 RX ORDER — SODIUM CHLORIDE, SODIUM LACTATE, POTASSIUM CHLORIDE, CALCIUM CHLORIDE 600; 310; 30; 20 MG/100ML; MG/100ML; MG/100ML; MG/100ML
INJECTION, SOLUTION INTRAVENOUS CONTINUOUS PRN
Status: DISCONTINUED | OUTPATIENT
Start: 2023-09-05 | End: 2023-09-05

## 2023-09-05 RX ORDER — LIDOCAINE HYDROCHLORIDE 10 MG/ML
INJECTION, SOLUTION EPIDURAL; INFILTRATION; INTRACAUDAL; PERINEURAL AS NEEDED
Status: DISCONTINUED | OUTPATIENT
Start: 2023-09-05 | End: 2023-09-05

## 2023-09-05 RX ORDER — PROPOFOL 10 MG/ML
INJECTION, EMULSION INTRAVENOUS AS NEEDED
Status: DISCONTINUED | OUTPATIENT
Start: 2023-09-05 | End: 2023-09-05

## 2023-09-05 RX ORDER — SODIUM CHLORIDE, SODIUM LACTATE, POTASSIUM CHLORIDE, CALCIUM CHLORIDE 600; 310; 30; 20 MG/100ML; MG/100ML; MG/100ML; MG/100ML
125 INJECTION, SOLUTION INTRAVENOUS CONTINUOUS
Status: DISCONTINUED | OUTPATIENT
Start: 2023-09-05 | End: 2023-09-09 | Stop reason: HOSPADM

## 2023-09-05 RX ADMIN — PROPOFOL 40 MG: 10 INJECTION, EMULSION INTRAVENOUS at 12:39

## 2023-09-05 RX ADMIN — LIDOCAINE HYDROCHLORIDE 100 MG: 10 INJECTION, SOLUTION EPIDURAL; INFILTRATION; INTRACAUDAL; PERINEURAL at 12:33

## 2023-09-05 RX ADMIN — SODIUM CHLORIDE, SODIUM LACTATE, POTASSIUM CHLORIDE, AND CALCIUM CHLORIDE 125 ML/HR: .6; .31; .03; .02 INJECTION, SOLUTION INTRAVENOUS at 11:09

## 2023-09-05 RX ADMIN — PROPOFOL 40 MG: 10 INJECTION, EMULSION INTRAVENOUS at 12:45

## 2023-09-05 RX ADMIN — SODIUM CHLORIDE, SODIUM LACTATE, POTASSIUM CHLORIDE, AND CALCIUM CHLORIDE: .6; .31; .03; .02 INJECTION, SOLUTION INTRAVENOUS at 12:30

## 2023-09-05 RX ADMIN — PROPOFOL 40 MG: 10 INJECTION, EMULSION INTRAVENOUS at 12:36

## 2023-09-05 RX ADMIN — PROPOFOL 160 MG: 10 INJECTION, EMULSION INTRAVENOUS at 12:33

## 2023-09-05 NOTE — H&P
History and Physical - [facility]  Lamin Holliday 50 y.o. male MRN: [id number]                  HPI: Lamin Holliday is a 50y.o. year old male who presents for screening colonoscopy      REVIEW OF SYSTEMS: Per the HPI, and otherwise unremarkable. Historical Information   History reviewed. No pertinent past medical history. History reviewed. No pertinent surgical history. Social History   Social History     Substance and Sexual Activity   Alcohol Use Yes     Social History     Substance and Sexual Activity   Drug Use Never     Social History     Tobacco Use   Smoking Status Former   • Packs/day: 1.00   • Years: 5.00   • Total pack years: 5.00   • Types: Cigarettes   • Quit date:    • Years since quittin.6   • Passive exposure: Past   Smokeless Tobacco Never     History reviewed. No pertinent family history. Meds/Allergies     (Not in a hospital admission)      No Known Allergies    Objective     /63   Pulse 58   Temp 98.4 °F (36.9 °C) (Temporal)   Resp 18   Ht 5' 8" (1.727 m)   Wt (!) 141 kg (311 lb 8.2 oz)   SpO2 96%   BMI 47.36 kg/m²       PHYSICAL EXAM    Gen: NAD  CV: RRR  CHEST: Clear  ABD: soft, NT/ND  EXT: no edema      ASSESSMENT/PLAN:  This is a 50y.o. year old male here for colonoscopy, and he is stable and optimized for his procedure.

## 2023-09-05 NOTE — ANESTHESIA PREPROCEDURE EVALUATION
Procedure:  COLONOSCOPY    Relevant Problems   ANESTHESIA (within normal limits)      CARDIO (within normal limits)      GI/HEPATIC   (+) Fatty liver      /RENAL   (+) Kidney stones      PULMONARY   (+) AGATHA on CPAP      Other   (+) BMI 45.0-49.9, adult (HCC)   (+) Morbid obesity (HCC)        Physical Exam    Airway    Mallampati score: III  TM Distance: >3 FB  Neck ROM: full     Dental   Comment: Denies loose teeth,     Cardiovascular  Rhythm: regular, Rate: normal,     Pulmonary  Breath sounds clear to auscultation,     Other Findings  Portions of exam deferred due to low yield and/or unknown COVID status      Anesthesia Plan  ASA Score- 3     Anesthesia Type- IV sedation with anesthesia with ASA Monitors. Additional Monitors:   Airway Plan:           Plan Factors-Exercise tolerance (METS): >4 METS. Chart reviewed. Existing labs reviewed. Patient summary reviewed. Patient is not a current smoker. Induction- intravenous. Postoperative Plan-     Informed Consent- Anesthetic plan and risks discussed with patient. I personally reviewed this patient with the CRNA. Discussed and agreed on the Anesthesia Plan with the CRNA. Mayank Amin

## 2023-09-05 NOTE — ANESTHESIA POSTPROCEDURE EVALUATION
Post-Op Assessment Note    CV Status:  Stable  Pain Score: 0    Pain management: adequate     Mental Status:  Alert and awake   Hydration Status:  Euvolemic and stable   PONV Controlled:  Controlled   Airway Patency:  Patent      Post Op Vitals Reviewed: Yes      Staff: CRNA         No notable events documented.     BP   111/59   Temp      Pulse  61   Resp   15   SpO2   95%

## 2023-09-06 PROBLEM — K57.30 COLON, DIVERTICULOSIS: Status: ACTIVE | Noted: 2023-09-06

## 2023-09-06 PROBLEM — K64.8 INTERNAL HEMORRHOIDS: Status: ACTIVE | Noted: 2023-09-06

## 2023-09-14 ENCOUNTER — OFFICE VISIT (OUTPATIENT)
Dept: UROLOGY | Facility: CLINIC | Age: 53
End: 2023-09-14

## 2023-09-14 VITALS
RESPIRATION RATE: 18 BRPM | SYSTOLIC BLOOD PRESSURE: 120 MMHG | DIASTOLIC BLOOD PRESSURE: 70 MMHG | HEART RATE: 70 BPM | BODY MASS INDEX: 47.74 KG/M2 | HEIGHT: 68 IN | WEIGHT: 315 LBS | OXYGEN SATURATION: 99 %

## 2023-09-14 DIAGNOSIS — N20.0 KIDNEY STONES: ICD-10-CM

## 2023-09-14 NOTE — PROGRESS NOTES
Office Visit- Urology  Rhianna Kuhn 1970 MRN: [id number]      Assessment/Discussion/Plan    50 y.o. male managed by     1. Obstructing ureteral stone  -Right 2 mm obstructing ureteral calculus with mild right hydronephrosis seen on CT scan on August 20, 2023  -Patient passed kidney stone. Did not bring into office today but has it at home. Stone analysis  -Discussed general measures for secondary prevention of nephrolithiasis  -Metabolic work-up  -Repeat CT scan to ensure passage of ureteral stone/resolution of hydronephrosis-  -follow-up after obtainment of metabolic work-up and CT scan    2. Prostate cancer screening  -PSA 0.49 on August 22, 2023  -Continue with prostate cancer screening through PCP     3. Microscopic hematuria  -In setting of obstructing ureteral stone  -Obtain repeat UA with micro after confirm passage of ureteral stone      Chief Complaint:   Hellen Ram is a 50 y.o. male presenting to the office for an initial evaluation regarding  Obstructing ureteral stone        Subjective    -61-year-old male  -Patient states that he has passed stones before about 5-6 times. Has never consulted with urologist.  Ozzy Marino had spontaneous passage  -Presents to the office today for evaluation of obstructing ureteral stone  -CT scan demonstrated a 2 mm Obstructing calculus in the right ureter with mild right hydronephrosis  -Patient states he passed a stone but not recall when he did so. He has a at home  e-Currently asymptomatic.   Is been asymptomatic since stone passage  -Denies any past history of dysuria  -PCP obtained PSAs with last PSA measuring 0.4 in August 2023        [facility] [facility] [place] Most Recent Value   AUA SYMPTOM SCORE    How often have you had a sensation of not emptying your bladder completely after you finished urinating? 0 (P)     How often have you had to urinate again less than two hours after you finished urinating? 1 (P)     How often have you found you stopped and started again several times when you urinate? 1 (P)     How often have you found it difficult to postpone urination? 1 (P)     How often have you had a weak urinary stream? 0 (P)     How often have you had to push or strain to begin urination? 0 (P)     How many times did you most typically get up to urinate from the time you went to bed at night until the time you got up in the morning? 1 (P)     Quality of Life: If you were to spend the rest of your life with your urinary condition just the way it is now, how would you feel about that? 1 (P)     AUA SYMPTOM SCORE 4 (P)           ROS:   Review of Systems   Constitutional: Negative. Negative for chills, fatigue and fever. HENT: Negative. Respiratory: Negative for shortness of breath. Cardiovascular: Negative for chest pain. Gastrointestinal: Negative. Negative for abdominal pain. Endocrine: Negative. Musculoskeletal: Negative. Skin: Negative. Neurological: Negative. Negative for dizziness and light-headedness. Hematological: Negative. Psychiatric/Behavioral: Negative. Past Medical History  History reviewed. No pertinent past medical history. Past Surgical History  History reviewed. No pertinent surgical history. Past Family History  History reviewed. No pertinent family history. Past Social history  Social History     Socioeconomic History   • Marital status: /Civil Union     Spouse name: Not on file   • Number of children: Not on file   • Years of education: Not on file   • Highest education level: Not on file   Occupational History   • Not on file   Tobacco Use   • Smoking status: Former     Packs/day: 1.00     Years: 5.00     Total pack years: 5.00     Types: Cigarettes     Quit date:      Years since quittin.7     Passive exposure: Past   • Smokeless tobacco: Never   Substance and Sexual Activity   • Alcohol use:  Yes   • Drug use: Never   • Sexual activity: Yes     Partners: Female   Other Topics Concern   • Not on file   Social History Narrative   • Not on file     Social Determinants of Health     Financial Resource Strain: Not on file   Food Insecurity: Not on file   Transportation Needs: Not on file   Physical Activity: Not on file   Stress: Not on file   Social Connections: Not on file   Intimate Partner Violence: Not on file   Housing Stability: Not on file       Current Medications  No current outpatient medications on file. No current facility-administered medications for this visit. Allergies  No Known Allergies    OBJECTIVE    Vitals   Vitals:    09/14/23 0819   BP: 120/70   BP Location: Left arm   Patient Position: Sitting   Cuff Size: Large   Pulse: 70   Resp: 18   SpO2: 99%   Weight: (!) 145 kg (320 lb 9.6 oz)   Height: 5' 8" (1.727 m)       PVR:    Physical Exam  Constitutional:       General: He is not in acute distress. Appearance: Normal appearance. He is normal weight. He is not ill-appearing or toxic-appearing. HENT:      Head: Normocephalic and atraumatic. Eyes:      Conjunctiva/sclera: Conjunctivae normal.   Cardiovascular:      Rate and Rhythm: Normal rate. Pulmonary:      Effort: Pulmonary effort is normal. No respiratory distress. Skin:     General: Skin is warm and dry. Neurological:      General: No focal deficit present. Mental Status: He is alert and oriented to person, place, and time. Cranial Nerves: No cranial nerve deficit. Psychiatric:         Mood and Affect: Mood normal.         Behavior: Behavior normal.         Thought Content:  Thought content normal.          Labs:     Lab Results   Component Value Date    PSA 0.5 02/04/2022     Lab Results   Component Value Date    CREATININE 1.05 08/25/2023      Lab Results   Component Value Date    HGBA1C 6.1 (H) 08/25/2023     Lab Results   Component Value Date    CALCIUM 9.0 08/20/2023    K 4.7 08/25/2023    CO2 23 08/25/2023     08/25/2023    BUN 16 08/25/2023    CREATININE 1.05 08/25/2023 I have personally reviewed all pertinent lab results and reviewed with patient    Imaging   CT ABDOMEN AND PELVIS WITHOUT IV CONTRAST - LOW DOSE RENAL STONE     INDICATION:   Flank pain, kidney stone suspected  R flank to testicle pain this morning, h/o kidney stone.        COMPARISON:  None.     TECHNIQUE:  Low radiation dose thin section CT examination of the abdomen and pelvis was performed without intravenous or oral contrast according to a protocol specifically designed to evaluate for urinary tract calculus. Axial, sagittal, and coronal 2D   reformatted images were created from the source data and submitted for interpretation. Evaluation for pathology in the abdomen and pelvis that is unrelated to urinary tract calculi is limited.     Radiation dose length product (DLP) for this visit:  767 mGy-cm . This examination, like all CT scans performed in the [facility], was performed utilizing techniques to minimize radiation dose exposure, including the use of iterative   reconstruction and automated exposure control.     URINARY TRACT FINDINGS:     RIGHT KIDNEY AND URETER: Mild dilatation of the right pelvicalyceal system and mild prominence of the right ureter seen. There is a punctate 2 mm calculus in the lower right ureter, seen in image 170 series 2 in the pelvic portion of the ureters     LEFT KIDNEY AND URETER:  No urinary tract calculi. No hydronephrosis or hydroureter. Nonobstructing calculus upper pole left kidney  URINARY BLADDER:  Unremarkable.        ADDITIONAL FINDINGS:     LOWER CHEST:  No clinically significant abnormality identified in the visualized lower chest.     SOLID VISCERA: Limited low radiation dose noncontrast CT evaluation demonstrates no clinically significant abnormality of the imaged portions of the liver, spleen, pancreas, or adrenal glands. Hepatic steatosis seen  GALLBLADDER/BILIARY TREE:  No calcified gallstones. No pericholecystic inflammatory change. No biliary dilatation.     STOMACH AND BOWEL:  Unremarkable.     APPENDIX:  No findings to suggest appendicitis.     ABDOMINOPELVIC CAVITY:  No ascites. No pneumoperitoneum. No lymphadenopathy.     VESSELS: Unremarkable for patient's age.     REPRODUCTIVE ORGANS: Prostate calcifications     ABDOMINAL WALL/INGUINAL REGIONS:  Unremarkable.     OSSEOUS STRUCTURES: No acute compression collapse of the vetebra. There are no gross lytic lesion     IMPRESSION:  2 mm obstructing calculus in the lower /pelvic portion of the right ureter with mild right hydronephrosis. (Image 170 series 2,)  Hepatic steatosis  Nonobstructing 3 mm calculus upper pole left kidney  The study was marked in EPIC for immediate notification. Josh Motta PA-C  Date: 9/14/2023 Time: 8:43 AM  [facility]    This note was written using fluency dictation software. Please excuse any resulting minor grammatical errors.

## 2023-09-15 ENCOUNTER — APPOINTMENT (OUTPATIENT)
Dept: RADIOLOGY | Facility: CLINIC | Age: 53
End: 2023-09-15
Payer: COMMERCIAL

## 2023-09-15 ENCOUNTER — OFFICE VISIT (OUTPATIENT)
Age: 53
End: 2023-09-15

## 2023-09-15 VITALS
BODY MASS INDEX: 47.74 KG/M2 | WEIGHT: 315 LBS | HEIGHT: 68 IN | HEART RATE: 65 BPM | DIASTOLIC BLOOD PRESSURE: 81 MMHG | SYSTOLIC BLOOD PRESSURE: 128 MMHG

## 2023-09-15 DIAGNOSIS — G57.61 NEUROMA OF SECOND INTERSPACE OF RIGHT FOOT: Primary | ICD-10-CM

## 2023-09-15 DIAGNOSIS — G89.29 PAIN, FOOT, CHRONIC, UNSPECIFIED LATERALITY: ICD-10-CM

## 2023-09-15 DIAGNOSIS — M79.673 PAIN, FOOT, CHRONIC, UNSPECIFIED LATERALITY: ICD-10-CM

## 2023-09-15 DIAGNOSIS — G57.62 NEUROMA OF SECOND INTERSPACE OF LEFT FOOT: ICD-10-CM

## 2023-09-15 PROCEDURE — 73630 X-RAY EXAM OF FOOT: CPT

## 2023-09-15 NOTE — PROGRESS NOTES
This patient was seen on 9/15/23. My role is Foot , Ankle, and Wound Specialist    ASSESSMENT     Diagnoses and all orders for this visit:    Neuroma of second interspace of right foot    Pain, foot, chronic, unspecified laterality  -     Ambulatory referral to Podiatry  -     X-ray foot left 3+ views; Future  -     X-ray foot right 3+ views; Future    Neuroma of second interspace of left foot         Problem List Items Addressed This Visit        Other    Pain, foot, chronic, unspecified laterality    Relevant Orders    X-ray foot left 3+ views    X-ray foot right 3+ views   Other Visit Diagnoses     Neuroma of second interspace of right foot    -  Primary    Neuroma of second interspace of left foot            PLAN  -Patient was educated regarding their condition.  -We discussed changes in shoe wear including finding shoes with a wider toe box and a more supportive sole. Also recommended OTC orthotics  -metatarsal pads were provided to the patient and they were informed on proper use  -RTC 4-weeks    -X-ray right foot from 9/15/2023 independently interpreted: no acute fractures noted. No abnormal calcification noted to the soft-tissues. Algis Forward sign noted between the second and third digits noted with transverse plane separation of second and third digits. -X-ray left foot from 9/15/2023 independently interpreted: no acute fractures noted. No abnormal calcification noted to the soft-tissues. Algis Forward sign noted between the second and third digits noted with transverse plane separation of second and third digits. SUBJECTIVE    Chief Complaint:  Bilateral foot pain     Patient ID: Manuel Muller is a 50 y.o. male. Camacho Santiago is a 29-year-old male who presents today with complaints of bilateral foot pain. He states that this has been going on for approximately a year. He denies any injury to his feet.   He states that currently the right foot is worse than the left although this is not always been the case previously. He has tried multiple different shoes and boots and has not found any that has offered him relief. He states that the majority the pain is to the front of his foot at the ball of his foot. The following portions of the patient's history were reviewed and updated as appropriate: allergies, current medications, past family history, past medical history, past social history, past surgical history and problem list.    Review of Systems   Constitutional: Negative. Respiratory: Negative. Cardiovascular: Negative. Gastrointestinal: Negative. Genitourinary: Negative. Musculoskeletal: Positive for arthralgias and myalgias. Skin: Negative. OBJECTIVE      /81   Pulse 65   Ht 5' 8" (1.727 m)   Wt (!) 145 kg (320 lb)   BMI 48.66 kg/m²        Physical Exam  Constitutional:       Appearance: Normal appearance. HENT:      Head: Normocephalic and atraumatic. Eyes:      General:         Right eye: No discharge. Left eye: No discharge. Cardiovascular:      Rate and Rhythm: Normal rate and regular rhythm. Pulses:           Dorsalis pedis pulses are 2+ on the right side and 2+ on the left side. Posterior tibial pulses are 2+ on the right side and 2+ on the left side. Pulmonary:      Effort: Pulmonary effort is normal.      Breath sounds: Normal breath sounds. Skin:     General: Skin is warm. Capillary Refill: Capillary refill takes less than 2 seconds. Neurological:      Mental Status: He is alert and oriented to person, place, and time. Sensory: Sensation is intact. No sensory deficit.    Psychiatric:         Mood and Affect: Mood normal.           MSK:  -Pain on palpation to the second intermetatarsal space bilaterally  -Web space palpation does elicit pain at the second intermetatarsal space  -Magaly click test positive bilaterally to the second intermetatarsal space  -No gross deformities noted   -MMT is 5/5 to all muscle compartments of the lower extremity  -Ankle dorsiflexion >10 degrees with knee extended and knee flexed b/l    Derm:  -No lesions, abrasions, or open wounds noted  -No noted interdigital maceration, peeling, malodor  -No callus formation noted on exam

## 2023-09-21 ENCOUNTER — HOSPITAL ENCOUNTER (OUTPATIENT)
Dept: RADIOLOGY | Facility: HOSPITAL | Age: 53
Discharge: HOME | End: 2023-09-21
Payer: COMMERCIAL

## 2023-09-21 ENCOUNTER — APPOINTMENT (OUTPATIENT)
Dept: LAB | Facility: CLINIC | Age: 53
End: 2023-09-21
Payer: COMMERCIAL

## 2023-09-21 DIAGNOSIS — N20.0 KIDNEY STONES: ICD-10-CM

## 2023-09-21 LAB
ANION GAP SERPL CALCULATED.3IONS-SCNC: 7 MMOL/L
BUN SERPL-MCNC: 18 MG/DL (ref 5–25)
CALCIUM SERPL-MCNC: 8.6 MG/DL (ref 8.4–10.2)
CHLORIDE SERPL-SCNC: 105 MMOL/L (ref 96–108)
CO2 SERPL-SCNC: 27 MMOL/L (ref 21–32)
CREAT SERPL-MCNC: 1.08 MG/DL (ref 0.6–1.3)
GFR SERPL CREATININE-BSD FRML MDRD: 79 ML/MIN/1.73SQ M
GLUCOSE P FAST SERPL-MCNC: 104 MG/DL (ref 65–99)
MAGNESIUM SERPL-MCNC: 1.8 MG/DL (ref 1.9–2.7)
PHOSPHATE SERPL-MCNC: 3.1 MG/DL (ref 2.7–4.5)
POTASSIUM SERPL-SCNC: 4.8 MMOL/L (ref 3.5–5.3)
PTH-INTACT SERPL-MCNC: 38.1 PG/ML (ref 12–88)
SODIUM SERPL-SCNC: 139 MMOL/L (ref 135–147)
URATE SERPL-MCNC: 6.2 MG/DL (ref 3.5–8.5)

## 2023-09-21 PROCEDURE — 82436 ASSAY OF URINE CHLORIDE: CPT

## 2023-09-21 PROCEDURE — 84300 ASSAY OF URINE SODIUM: CPT

## 2023-09-21 PROCEDURE — 82570 ASSAY OF URINE CREATININE: CPT

## 2023-09-21 PROCEDURE — 84105 ASSAY OF URINE PHOSPHORUS: CPT

## 2023-09-21 PROCEDURE — 82140 ASSAY OF AMMONIA: CPT

## 2023-09-21 PROCEDURE — 82360 CALCULUS ASSAY QUANT: CPT

## 2023-09-21 PROCEDURE — 82131 AMINO ACIDS SINGLE QUANT: CPT

## 2023-09-21 PROCEDURE — 82507 ASSAY OF CITRATE: CPT

## 2023-09-21 PROCEDURE — 82340 ASSAY OF CALCIUM IN URINE: CPT

## 2023-09-21 PROCEDURE — 81003 URINALYSIS AUTO W/O SCOPE: CPT

## 2023-09-21 PROCEDURE — 83735 ASSAY OF MAGNESIUM: CPT

## 2023-09-21 PROCEDURE — 36415 COLL VENOUS BLD VENIPUNCTURE: CPT

## 2023-09-21 PROCEDURE — 84392 ASSAY OF URINE SULFATE: CPT

## 2023-09-21 PROCEDURE — 83935 ASSAY OF URINE OSMOLALITY: CPT

## 2023-09-21 PROCEDURE — 83945 ASSAY OF OXALATE: CPT

## 2023-09-21 PROCEDURE — G1004 CDSM NDSC: HCPCS

## 2023-09-21 PROCEDURE — 83970 ASSAY OF PARATHORMONE: CPT

## 2023-09-21 PROCEDURE — 84133 ASSAY OF URINE POTASSIUM: CPT

## 2023-09-21 PROCEDURE — 84100 ASSAY OF PHOSPHORUS: CPT

## 2023-09-21 PROCEDURE — 74176 CT ABD & PELVIS W/O CONTRAST: CPT

## 2023-09-21 PROCEDURE — 80048 BASIC METABOLIC PNL TOTAL CA: CPT

## 2023-09-21 PROCEDURE — 84560 ASSAY OF URINE/URIC ACID: CPT

## 2023-09-21 PROCEDURE — 84550 ASSAY OF BLOOD/URIC ACID: CPT

## 2023-09-22 ENCOUNTER — OFFICE VISIT (OUTPATIENT)
Dept: FAMILY MEDICINE CLINIC | Facility: CLINIC | Age: 53
End: 2023-09-22
Payer: COMMERCIAL

## 2023-09-22 VITALS
HEART RATE: 68 BPM | TEMPERATURE: 98.7 F | HEIGHT: 67 IN | RESPIRATION RATE: 18 BRPM | SYSTOLIC BLOOD PRESSURE: 128 MMHG | DIASTOLIC BLOOD PRESSURE: 82 MMHG | BODY MASS INDEX: 49.44 KG/M2 | WEIGHT: 315 LBS

## 2023-09-22 DIAGNOSIS — G47.33 OSA ON CPAP: ICD-10-CM

## 2023-09-22 DIAGNOSIS — N20.0 KIDNEY STONES: ICD-10-CM

## 2023-09-22 DIAGNOSIS — E66.01 MORBID OBESITY (HCC): ICD-10-CM

## 2023-09-22 DIAGNOSIS — K76.0 FATTY LIVER: ICD-10-CM

## 2023-09-22 DIAGNOSIS — Z00.00 HEALTHCARE MAINTENANCE: Primary | ICD-10-CM

## 2023-09-22 PROCEDURE — 99396 PREV VISIT EST AGE 40-64: CPT | Performed by: FAMILY MEDICINE

## 2023-09-22 NOTE — PROGRESS NOTES
Assessment/Plan:    No problem-specific Assessment & Plan notes found for this encounter. cpe    Kidney stones  Saw urology  Stay hydrated  Stone analysis pendign    bmi noted  Does have muscle mass  Has AGATHA also  Bariatric referrals given    Fatty liver advised  Wt loss and exercise suggested    agatha unchanged  F/u with Dr. Ezequiel Tinoco     Diagnoses and all orders for this visit:    Healthcare maintenance    BMI 45.0-49.9, adult [facility])  -     Ambulatory referral to Weight Management; Future  -     Ambulatory referral to Bariatric Surgery; Future    Morbid obesity ([address])  -     Ambulatory referral to Weight Management; Future  -     Ambulatory referral to Bariatric Surgery; Future    AGATHA on CPAP  -     Ambulatory referral to Weight Management; Future  -     Ambulatory referral to Bariatric Surgery; Future    Fatty liver  -     Ambulatory referral to Weight Management; Future  -     Ambulatory referral to Bariatric Surgery; Future    Kidney stones        Return if symptoms worsen or fail to improve. Subjective:      Patient ID: Jose Purdy is a 50 y.o. male. Chief Complaint   Patient presents with   • Physical Exam     HK CMA        HPI  Labs reviewed  Drinking water    The following portions of the patient's history were reviewed and updated as appropriate: allergies, current medications, past family history, past medical history, past social history, past surgical history and problem list.    Review of Systems   Constitutional: Negative for chills and fever. No current outpatient medications on file. No current facility-administered medications for this visit. Objective:    /82   Pulse 68   Temp 98.7 °F (37.1 °C)   Resp 18   Ht 5' 7" (1.702 m)   Wt (!) 144 kg (317 lb)   BMI 49.65 kg/m²        Physical Exam  Vitals and nursing note reviewed. Constitutional:       Appearance: He is well-developed. He is obese. He is not ill-appearing. HENT:      Head: Normocephalic.       Right Ear: Tympanic membrane normal.      Left Ear: Tympanic membrane normal.   Eyes:      General: No scleral icterus. Conjunctiva/sclera: Conjunctivae normal.   Neck:      Vascular: No carotid bruit. Cardiovascular:      Rate and Rhythm: Normal rate and regular rhythm. Heart sounds: No murmur heard. Pulmonary:      Effort: Pulmonary effort is normal. No respiratory distress. Breath sounds: No wheezing. Abdominal:      General: There is no distension. Palpations: Abdomen is soft. Tenderness: There is no abdominal tenderness. Hernia: No hernia is present. Genitourinary:     Penis: Normal.       Testes: Normal.      Prostate: Normal.      Rectum: Normal.   Musculoskeletal:         General: No deformity. Cervical back: Neck supple. Right lower leg: No edema. Left lower leg: No edema. Skin:     General: Skin is warm and dry. Coloration: Skin is not pale. Neurological:      Mental Status: He is alert. Motor: No weakness. Gait: Gait normal.   Psychiatric:         Mood and Affect: Mood normal.         Behavior: Behavior normal.         Thought Content: Thought content normal.       BMI Counseling: Body mass index is 49.65 kg/m². The BMI is above normal. Nutrition recommendations include decreasing portion sizes and moderation in carbohydrate intake. Exercise recommendations include exercising 3-5 times per week. No pharmacotherapy was ordered. Rationale for BMI follow-up plan is due to patient being overweight or obese.               Yasmin Snider DO

## 2023-09-28 ENCOUNTER — OFFICE VISIT (OUTPATIENT)
Dept: UROLOGY | Facility: CLINIC | Age: 53
End: 2023-09-28

## 2023-09-28 VITALS
HEART RATE: 56 BPM | BODY MASS INDEX: 49.44 KG/M2 | WEIGHT: 315 LBS | DIASTOLIC BLOOD PRESSURE: 82 MMHG | OXYGEN SATURATION: 99 % | HEIGHT: 67 IN | SYSTOLIC BLOOD PRESSURE: 130 MMHG

## 2023-09-28 DIAGNOSIS — N20.0 KIDNEY STONES: Primary | ICD-10-CM

## 2023-09-28 NOTE — PROGRESS NOTES
Office Visit- Urology  Estefanía Jones 1970 MRN: [id number]      Assessment/Discussion/Plan    50 y.o. male managed by     1. Obstructing ureteral stone  -Resolved per CT imaging on 9/21/2023  -4 mm left lower pole nonobstructive renal calculus  -Metabolic work-up-without abnormality  -Repeat imaging in a year with a renal ultrasound     2. Microscopic hematuria  -Episode was in the setting of obstructing ureteral stone  -Repeat urine testing today. If negative patient does not have routine urinalyses with PCP. If positive would consider obtainment of hematuria work-up    3. Prostate cancer screening  -Last PSA was 0.49 in August 22, 2023  -Patient can continue with prostate cancer screening through PCP      Chief Complaint:   Ghada Mena is a 50 y.o. male presenting to the office for a follow up visit regarding  Obstructing ureteral stone        Subjective      -79-year-old male  -Patient states that he has passed stones before about 5-6 times. Has never consulted with urologist.  Carmela Hernandez had spontaneous passage  -Presents to the office today for evaluation of obstructing ureteral stone  -CT scan demonstrated a 2 mm Obstructing calculus in the right ureter with mild right hydronephrosis  -Patient states he passed a stone but not recall when he did so. He has a at home  e-Currently asymptomatic.   Is been asymptomatic since stone passage  -Denies any past history of dysuria  -PCP obtained PSAs with last PSA measuring 0.4 in August 2023      History since interim  -No report of any bothersome urinary symptoms  -No dysuria, gross hematuria, flank pain  -Patient obtained repeat CT scan and metabolic work-up    AUA SYMPTOM SCORE    Flowsheet Row Most Recent Value   AUA SYMPTOM SCORE    How often have you had a sensation of not emptying your bladder completely after you finished urinating? 0 (P)     How often have you had to urinate again less than two hours after you finished urinating? 1 (P)     How often have you found you stopped and started again several times when you urinate? 0 (P)     How often have you found it difficult to postpone urination? 0 (P)     How often have you had a weak urinary stream? 0 (P)     How often have you had to push or strain to begin urination? 0 (P)     How many times did you most typically get up to urinate from the time you went to bed at night until the time you got up in the morning? 0 (P)     Quality of Life: If you were to spend the rest of your life with your urinary condition just the way it is now, how would you feel about that? 0 (P)     AUA SYMPTOM SCORE 1 (P)           ROS:   Review of Systems   Constitutional: Negative. Negative for chills, fatigue and fever. HENT: Negative. Respiratory: Negative for shortness of breath. Cardiovascular: Negative for chest pain. Gastrointestinal: Negative. Negative for abdominal pain. Endocrine: Negative. Musculoskeletal: Negative. Skin: Negative. Neurological: Negative. Negative for dizziness and light-headedness. Hematological: Negative. Psychiatric/Behavioral: Negative. Past Medical History  History reviewed. No pertinent past medical history. Past Surgical History  History reviewed. No pertinent surgical history.     Past Family History  Family History   Problem Relation Age of Onset   • Diabetes Sister    • Diabetes Brother        Past Social history  Social History     Socioeconomic History   • Marital status: /Civil Union     Spouse name: Not on file   • Number of children: Not on file   • Years of education: Not on file   • Highest education level: Not on file   Occupational History   • Not on file   Tobacco Use   • Smoking status: Former     Packs/day: 2.00     Years: 15.00     Total pack years: 30.00     Types: Cigarettes     Start date: 7/15/1990     Quit date: 5/15/2005     Years since quittin.3     Passive exposure: Past   • Smokeless tobacco: Former     Types: Chew     Quit date: 5/15/2005 Vaping Use   • Vaping Use: Never used   Substance and Sexual Activity   • Alcohol use: Yes   • Drug use: Never   • Sexual activity: Yes     Partners: Female     Birth control/protection: None   Other Topics Concern   • Not on file   Social History Narrative   • Not on file     Social Determinants of Health     Financial Resource Strain: Not on file   Food Insecurity: Not on file   Transportation Needs: Not on file   Physical Activity: Not on file   Stress: Not on file   Social Connections: Not on file   Intimate Partner Violence: Not on file   Housing Stability: Not on file       Current Medications  No current outpatient medications on file. No current facility-administered medications for this visit. Allergies  No Known Allergies    OBJECTIVE    Vitals   Vitals:    09/28/23 1116   BP: 130/82   BP Location: Right arm   Patient Position: Sitting   Cuff Size: Adult   Pulse: 56   SpO2: 99%   Weight: (!) 145 kg (320 lb)   Height: 5' 7" (1.702 m)       PVR:    Physical Exam  Constitutional:       General: He is not in acute distress. Appearance: Normal appearance. He is normal weight. He is not ill-appearing or toxic-appearing. HENT:      Head: Normocephalic and atraumatic. Eyes:      Conjunctiva/sclera: Conjunctivae normal.   Cardiovascular:      Rate and Rhythm: Normal rate. Pulmonary:      Effort: Pulmonary effort is normal. No respiratory distress. Skin:     General: Skin is warm and dry. Neurological:      General: No focal deficit present. Mental Status: He is alert and oriented to person, place, and time. Cranial Nerves: No cranial nerve deficit. Psychiatric:         Mood and Affect: Mood normal.         Behavior: Behavior normal.         Thought Content:  Thought content normal.          Labs:     Lab Results   Component Value Date    PSA 0.5 02/04/2022     Lab Results   Component Value Date    CREATININE 1.08 09/21/2023      Lab Results   Component Value Date    HGBA1C 6.1 (H) 08/25/2023     Lab Results   Component Value Date    CALCIUM 8.6 09/21/2023    K 4.8 09/21/2023    CO2 27 09/21/2023     09/21/2023    BUN 18 09/21/2023    CREATININE 1.08 09/21/2023       I have personally reviewed all pertinent lab results and reviewed with patient    Imaging     CT ABDOMEN AND PELVIS WITHOUT IV CONTRAST - LOW DOSE RENAL STONE     INDICATION:   N20.0: Calculus of kidney.        COMPARISON: CT dated 8/20/2023     TECHNIQUE:  Low radiation dose thin section CT examination of the abdomen and pelvis was performed without intravenous or oral contrast according to a protocol specifically designed to evaluate for urinary tract calculus. Axial, sagittal, and coronal 2D   reformatted images were created from the source data and submitted for interpretation. Evaluation for pathology in the abdomen and pelvis that is unrelated to urinary tract calculi is limited.     Radiation dose length product (DLP) for this visit:  1917.65 mGy-cm . This examination, like all CT scans performed in the [facility], was performed utilizing techniques to minimize radiation dose exposure, including the use of   iterative reconstruction and automated exposure control.     URINARY TRACT FINDINGS:     RIGHT KIDNEY AND URETER:  No urinary tract calculi. No hydronephrosis or hydroureter.     LEFT KIDNEY AND URETER: No hydronephrosis. The previously seen upper pole 4 mm calculus is now in the lower pole.     URINARY BLADDER:  Unremarkable.        ADDITIONAL FINDINGS:     LOWER CHEST:  No clinically significant abnormality identified in the visualized lower chest.     SOLID VISCERA: There is fat infiltration of the liver with areas of fatty sparing. There is a hypodense lesion probably a cyst measuring 1.2 cm (series 2, #24) in hepatic segment 2.  Limited low radiation dose noncontrast CT evaluation demonstrates no   clinically significant abnormality of the imaged portions of the liver, spleen, pancreas, or adrenal glands.     GALLBLADDER/BILIARY TREE:  No calcified gallstones. No pericholecystic inflammatory change. No biliary dilatation.     STOMACH AND BOWEL: Mild to moderate sigmoid colonic diverticulosis.     No bowel obstruction.     APPENDIX:  No findings to suggest appendicitis.     ABDOMINOPELVIC CAVITY:  No ascites. No pneumoperitoneum. No lymphadenopathy.     VESSELS: Unremarkable for patient's age.     REPRODUCTIVE ORGANS:  Unremarkable for patient's age.     ABDOMINAL WALL/INGUINAL REGIONS: There is a small fat-containing right inguinal hernia.     There is a tiny fat-containing umbilical hernia.     OSSEOUS STRUCTURES:  No acute fracture or destructive osseous lesion. Spinal degenerative changes are noted.     IMPRESSION:     Resolution of previously seen right hydronephrosis and distal right ureteral calculus. 4 mm left lower pole nonobstructive renal calculus. Additional findings as described above.     Eran Irwin PA-C  Date: 9/28/2023 Time: 11:20 AM  [facility]    This note was written using [employer] dictation software. Please excuse any resulting minor grammatical errors.

## 2023-09-29 LAB
CALCIUM OXALATE DIHYDRATE MFR STONE IR: 90 %
COLOR STONE: NORMAL
COM MFR STONE: 10 %
COMMENT-STONE3: NORMAL
COMPOSITION: NORMAL
LABORATORY COMMENT REPORT: NORMAL
PHOTO: NORMAL
SIZE STONE: NORMAL MM
SPEC SOURCE SUBJ: NORMAL
STONE ANALYSIS-IMP: NORMAL
WT STONE: 2 MG

## 2023-10-03 LAB
AMMONIA 24H UR-MRATE: 33 MEQ/24 HR
AMMONIA UR-SCNC: ABNORMAL UG/DL
CA H2 PHOS DIHYD CRY URNS QL MICRO: 2.78 RATIO (ref 0–3)
CALCIUM 24H UR-MCNC: 25.2 MG/DL
CALCIUM 24H UR-MRATE: 567 MG/24 HR (ref 0–320)
CHLORIDE 24H UR-SCNC: 172 MMOL/L
CHLORIDE 24H UR-SRATE: 387 MMOL/24 HR (ref 52–264)
CITRATE 24H UR-MCNC: 622 MG/L
CITRATE 24H UR-MRATE: 1400 MG/24 HR (ref 320–1240)
COM CRY STONE QL IR: 4.53 RATIO (ref 0–6)
CREAT 24H UR-MCNC: 158.3 MG/DL
CREAT 24H UR-MRATE: 3561.8 MG/24 HR (ref 1000–2000)
CYSTINE 24H UR-MCNC: 12.68 MG/L
CYSTINE 24H UR-MRATE: 28.53 MG/24 HR (ref 2.1–58)
MAGNESIUM 24H UR-MRATE: 142 MG/24 HR (ref 12–293)
MAGNESIUM UR-MCNC: 6.3 MG/DL
NA URATE CRY STONE QL IR: 9.72 RATIO (ref 0–4)
OSMOLALITY UR: 841 MOSMOL/KG (ref 300–900)
OXALATE 24H UR-MRATE: 25 MG/24 HR (ref 7–44)
OXALATE UR-MCNC: 11 MG/L
PH 24H UR: 5.8 [PH] (ref 4.5–8)
PHOSPHATE 24H UR-MCNC: 96.3 MG/DL
PHOSPHATE 24H UR-MRATE: 2166.8 MG/24 HR (ref 390–1425)
PLEASE NOTE (STONE RISK): ABNORMAL
POTASSIUM 24H UR-SCNC: 126.7 MMOL/24 HR (ref 20–116)
POTASSIUM UR-SCNC: 56.3 MMOL/L
PRESERVED URINE: 2250 ML/24 HR (ref 800–1800)
SODIUM 24H UR-SCNC: 194 MMOL/L
SODIUM 24H UR-SRATE: 437 MMOL/24 HR (ref 58–337)
SPECIMEN VOL 24H UR: 2250 ML/24 HR (ref 800–1800)
SULFATE 24H UR-MCNC: 63 MEQ/24 HR (ref 0–30)
SULFATE UR-MCNC: 28 MEQ/L
TRI-PHOS CRY STONE MICRO: 0.01 RATIO (ref 0–1)
URATE 24H UR-MCNC: 54.4 MG/DL
URATE 24H UR-MRATE: 1224 MG/24 HR (ref 197–1079)
URATE DIHYD CRY STONE QL IR: 3.19 RATIO (ref 0–1.2)

## 2023-10-13 ENCOUNTER — OFFICE VISIT (OUTPATIENT)
Age: 53
End: 2023-10-13
Payer: COMMERCIAL

## 2023-10-13 VITALS
RESPIRATION RATE: 18 BRPM | WEIGHT: 315 LBS | SYSTOLIC BLOOD PRESSURE: 126 MMHG | HEIGHT: 67 IN | HEART RATE: 66 BPM | DIASTOLIC BLOOD PRESSURE: 69 MMHG | BODY MASS INDEX: 49.44 KG/M2

## 2023-10-13 DIAGNOSIS — G57.61 NEUROMA OF SECOND INTERSPACE OF RIGHT FOOT: Primary | ICD-10-CM

## 2023-10-13 DIAGNOSIS — G89.29 PAIN, FOOT, CHRONIC, UNSPECIFIED LATERALITY: ICD-10-CM

## 2023-10-13 DIAGNOSIS — M24.571 EQUINUS CONTRACTURE OF RIGHT ANKLE: ICD-10-CM

## 2023-10-13 DIAGNOSIS — M79.673 PAIN, FOOT, CHRONIC, UNSPECIFIED LATERALITY: ICD-10-CM

## 2023-10-13 PROCEDURE — 99213 OFFICE O/P EST LOW 20 MIN: CPT | Performed by: STUDENT IN AN ORGANIZED HEALTH CARE EDUCATION/TRAINING PROGRAM

## 2023-10-13 PROCEDURE — 20550 NJX 1 TENDON SHEATH/LIGAMENT: CPT | Performed by: STUDENT IN AN ORGANIZED HEALTH CARE EDUCATION/TRAINING PROGRAM

## 2023-10-13 RX ORDER — TRIAMCINOLONE ACETONIDE 40 MG/ML
40 INJECTION, SUSPENSION INTRA-ARTICULAR; INTRAMUSCULAR ONCE
Status: COMPLETED | OUTPATIENT
Start: 2023-10-13 | End: 2023-10-13

## 2023-10-13 RX ORDER — LIDOCAINE HYDROCHLORIDE 10 MG/ML
1 INJECTION, SOLUTION EPIDURAL; INFILTRATION; INTRACAUDAL; PERINEURAL ONCE
Status: COMPLETED | OUTPATIENT
Start: 2023-10-13 | End: 2023-10-13

## 2023-10-13 RX ORDER — DEXAMETHASONE SODIUM PHOSPHATE 4 MG/ML
4 INJECTION, SOLUTION INTRA-ARTICULAR; INTRALESIONAL; INTRAMUSCULAR; INTRAVENOUS; SOFT TISSUE ONCE
Status: COMPLETED | OUTPATIENT
Start: 2023-10-13 | End: 2023-10-13

## 2023-10-13 RX ADMIN — TRIAMCINOLONE ACETONIDE 40 MG: 40 INJECTION, SUSPENSION INTRA-ARTICULAR; INTRAMUSCULAR at 13:17

## 2023-10-13 RX ADMIN — DEXAMETHASONE SODIUM PHOSPHATE 4 MG: 4 INJECTION, SOLUTION INTRA-ARTICULAR; INTRALESIONAL; INTRAMUSCULAR; INTRAVENOUS; SOFT TISSUE at 13:16

## 2023-10-13 RX ADMIN — LIDOCAINE HYDROCHLORIDE 1 ML: 10 INJECTION, SOLUTION EPIDURAL; INFILTRATION; INTRACAUDAL; PERINEURAL at 13:17

## 2023-10-13 NOTE — PROGRESS NOTES
This patient was seen on 10/13/23. My role is Foot , Ankle, and Wound Specialist    ASSESSMENT     Diagnoses and all orders for this visit:    Neuroma of second interspace of right foot  -     MRI foot/forefoot toes right wo contrast; Future  -     Ambulatory referral to Physical Therapy; Future    Pain, foot, chronic, unspecified laterality  -     MRI foot/forefoot toes right wo contrast; Future    Equinus contracture of right ankle  -     Ambulatory referral to Physical Therapy; Future         Problem List Items Addressed This Visit          Other    Pain, foot, chronic, unspecified laterality    Relevant Orders    MRI foot/forefoot toes right wo contrast     Other Visit Diagnoses       Neuroma of second interspace of right foot    -  Primary    Relevant Orders    MRI foot/forefoot toes right wo contrast    Ambulatory referral to Physical Therapy    Equinus contracture of right ankle        Relevant Orders    Ambulatory referral to Physical Therapy          PLAN  -Patient was educated regarding their condition.  -Continue supportive footwear with OTC orthotics and metatarsal pads as discussed last visit  -f/u MRI right foot  -Physical therapy for equinus contracture  -Corticosteroid injection of right 2nd intermetatarsal space performed as below:  -RTC 6-weeks    -Foot/lower extremity injection    Performed by: Lasha Matson DPM  Authorized by: Lasha Matson DPM    Procedure:      Other Assisting Provider: No      Verbal consent obtained?: Yes      Risks and benefits: Risks, benefits and alternatives were discussed      Consent given by:  Patient    Patient states understanding of procedure being performed: Yes      Site marked: Yes      Patient identity confirmed:  Verbally with patient    Supporting Documentation:     Indications:  Pain    Procedure Details:                Ethyl Chloride was applied      Needle size: 25 G G    Ultrasound Guidance: no      Approach:  Dorsal    Laterality:  Right    Cyst Aspiration/Injection: No      Location comment:  R foot second metatarsal interspace    Injection Information:       Patient tolerance:  Patient tolerated the procedure well with no immediate complications         SUBJECTIVE    Chief Complaint:  R foot pain     Patient ID: Jose Escalante is a 50 y.o. male. 9/15/2023: Humza Ha is a 29-year-old male who presents today with complaints of bilateral foot pain. He states that this has been going on for approximately a year. He denies any injury to his feet. He states that currently the right foot is worse than the left although this is not always been the case previously. He has tried multiple different shoes and boots and has not found any that has offered him relief. He states that the majority the pain is to the front of his foot at the ball of his foot. 10/13/2023: Humza Ha states that his right foot symptoms have not improved since last visit. He states that he has been using his over-the-counter orthotics as well as the metatarsal pads as instructed with little to no difference being made. He is interested in having an injection performed today. The following portions of the patient's history were reviewed and updated as appropriate: allergies, current medications, past family history, past medical history, past social history, past surgical history and problem list.    Review of Systems   Constitutional: Negative. Respiratory: Negative. Cardiovascular: Negative. Gastrointestinal: Negative. Genitourinary: Negative. Musculoskeletal:  Positive for arthralgias and myalgias. Skin: Negative. OBJECTIVE      /69   Pulse 66   Resp 18   Ht 5' 7" (1.702 m)   Wt (!) 144 kg (317 lb 3.2 oz)   BMI 49.68 kg/m²        Physical Exam  Constitutional:       Appearance: Normal appearance. HENT:      Head: Normocephalic and atraumatic. Eyes:      General:         Right eye: No discharge. Left eye: No discharge.    Cardiovascular: Rate and Rhythm: Normal rate and regular rhythm. Pulses:           Dorsalis pedis pulses are 2+ on the right side and 2+ on the left side. Posterior tibial pulses are 2+ on the right side and 2+ on the left side. Pulmonary:      Effort: Pulmonary effort is normal.      Breath sounds: Normal breath sounds. Skin:     General: Skin is warm. Capillary Refill: Capillary refill takes less than 2 seconds. Neurological:      Mental Status: He is alert and oriented to person, place, and time. Sensory: Sensation is intact. No sensory deficit.    Psychiatric:         Mood and Affect: Mood normal.         MSK:  -Pain on palpation to the second intermetatarsal space bilaterally  -Web space palpation does elicit pain at the second intermetatarsal space  -Magaly click test positive bilaterally to the second intermetatarsal space  -No gross deformities noted   -MMT is 5/5 to all muscle compartments of the lower extremity  -Ankle dorsiflexion <10 degrees with knee extended and knee flexed b/l     Derm:  -No lesions, abrasions, or open wounds noted  -No noted interdigital maceration, peeling, malodor  -No callus formation noted on exam

## 2023-10-22 PROBLEM — Z13.83 SCREENING FOR CARDIOVASCULAR, RESPIRATORY, AND GENITOURINARY DISEASES: Status: RESOLVED | Noted: 2023-08-23 | Resolved: 2023-10-22

## 2023-10-22 PROBLEM — Z12.11 COLON CANCER SCREENING: Status: RESOLVED | Noted: 2023-08-23 | Resolved: 2023-10-22

## 2023-10-22 PROBLEM — Z13.1 SCREENING FOR DIABETES MELLITUS (DM): Status: RESOLVED | Noted: 2023-08-23 | Resolved: 2023-10-22

## 2023-10-22 PROBLEM — Z13.6 SCREENING FOR CARDIOVASCULAR, RESPIRATORY, AND GENITOURINARY DISEASES: Status: RESOLVED | Noted: 2023-08-23 | Resolved: 2023-10-22

## 2023-10-22 PROBLEM — Z13.89 SCREENING FOR CARDIOVASCULAR, RESPIRATORY, AND GENITOURINARY DISEASES: Status: RESOLVED | Noted: 2023-08-23 | Resolved: 2023-10-22

## 2023-10-22 PROBLEM — Z12.5 PROSTATE CANCER SCREENING: Status: RESOLVED | Noted: 2023-08-23 | Resolved: 2023-10-22

## 2023-10-31 ENCOUNTER — HOSPITAL ENCOUNTER (OUTPATIENT)
Dept: RADIOLOGY | Facility: HOSPITAL | Age: 53
Discharge: HOME | End: 2023-10-31
Attending: STUDENT IN AN ORGANIZED HEALTH CARE EDUCATION/TRAINING PROGRAM
Payer: COMMERCIAL

## 2023-10-31 DIAGNOSIS — M79.673 PAIN, FOOT, CHRONIC, UNSPECIFIED LATERALITY: ICD-10-CM

## 2023-10-31 DIAGNOSIS — G57.61 NEUROMA OF SECOND INTERSPACE OF RIGHT FOOT: ICD-10-CM

## 2023-10-31 DIAGNOSIS — G89.29 PAIN, FOOT, CHRONIC, UNSPECIFIED LATERALITY: ICD-10-CM

## 2023-10-31 PROCEDURE — 73718 MRI LOWER EXTREMITY W/O DYE: CPT

## 2023-10-31 PROCEDURE — G1004 CDSM NDSC: HCPCS

## 2023-11-03 ENCOUNTER — TELEPHONE (OUTPATIENT)
Age: 53
End: 2023-11-03

## 2023-11-21 PROBLEM — Z00.00 HEALTHCARE MAINTENANCE: Status: RESOLVED | Noted: 2023-09-22 | Resolved: 2023-11-21

## 2023-11-24 ENCOUNTER — OFFICE VISIT (OUTPATIENT)
Age: 53
End: 2023-11-24
Payer: COMMERCIAL

## 2023-11-24 VITALS
RESPIRATION RATE: 18 BRPM | BODY MASS INDEX: 49.44 KG/M2 | HEIGHT: 67 IN | SYSTOLIC BLOOD PRESSURE: 132 MMHG | DIASTOLIC BLOOD PRESSURE: 80 MMHG | WEIGHT: 315 LBS | HEART RATE: 59 BPM

## 2023-11-24 DIAGNOSIS — G57.61 NEUROMA OF SECOND INTERSPACE OF RIGHT FOOT: Primary | ICD-10-CM

## 2023-11-24 DIAGNOSIS — M79.673 PAIN, FOOT, CHRONIC, UNSPECIFIED LATERALITY: ICD-10-CM

## 2023-11-24 DIAGNOSIS — M24.571 EQUINUS CONTRACTURE OF RIGHT ANKLE: ICD-10-CM

## 2023-11-24 DIAGNOSIS — G89.29 PAIN, FOOT, CHRONIC, UNSPECIFIED LATERALITY: ICD-10-CM

## 2023-11-24 DIAGNOSIS — G57.62 NEUROMA OF SECOND INTERSPACE OF LEFT FOOT: ICD-10-CM

## 2023-11-24 PROCEDURE — 99212 OFFICE O/P EST SF 10 MIN: CPT | Performed by: STUDENT IN AN ORGANIZED HEALTH CARE EDUCATION/TRAINING PROGRAM

## 2023-11-24 NOTE — PROGRESS NOTES
This patient was seen on 11/24/2023. My role is Foot , Ankle, and Wound Specialist    ASSESSMENT     Diagnoses and all orders for this visit:    Neuroma of second interspace of right foot    Neuroma of second interspace of left foot    Equinus contracture of right ankle    Pain, foot, chronic, unspecified laterality         Problem List Items Addressed This Visit          Other    Pain, foot, chronic, unspecified laterality     Other Visit Diagnoses       Neuroma of second interspace of right foot    -  Primary    Neuroma of second interspace of left foot        Equinus contracture of right ankle              PLAN  -Sherwin and I discussed his bilateral feet  -Continue Achilles tendon stretches for equinus improvement  -Continue supportive footwear with over-the-counter orthotics and metatarsal pads  -Corticosteroid injection deferred today  -Return to clinic as needed for increased pain    SUBJECTIVE    Chief Complaint:  Bilateral foot pain, now much improved     Patient ID: See Dus     9/15/2023: Taylor Villalta is a 53-year-old male who presents today with complaints of bilateral foot pain. He states that this has been going on for approximately a year. He denies any injury to his feet. He states that currently the right foot is worse than the left although this is not always been the case previously. He has tried multiple different shoes and boots and has not found any that has offered him relief. He states that the majority the pain is to the front of his foot at the ball of his foot. 11/24/23: Taylor Villalta states that he is doing approximately 80% better since his last visit. He states that he has been wearing supportive sneakers as well as good over-the-counter orthotics. He also states that he is doing his Achilles tendon stretches as much as possible. He does not believe that he needs a corticosteroid injection today as he is feeling much better.         The following portions of the patient's history were reviewed and updated as appropriate: allergies, current medications, past family history, past medical history, past social history, past surgical history and problem list.    Review of Systems   Constitutional: Negative. Respiratory: Negative. Cardiovascular: Negative. Gastrointestinal: Negative. Genitourinary: Negative. Musculoskeletal:  Positive for myalgias. Negative for arthralgias. Skin: Negative. OBJECTIVE      /80   Pulse 59   Resp 18   Ht 5' 7" (1.702 m)   Wt (!) 144 kg (317 lb)   BMI 49.65 kg/m²        Physical Exam  Constitutional:       Appearance: Normal appearance. HENT:      Head: Normocephalic and atraumatic. Eyes:      General:         Right eye: No discharge. Left eye: No discharge. Cardiovascular:      Rate and Rhythm: Normal rate and regular rhythm. Pulses:           Dorsalis pedis pulses are 2+ on the right side and 2+ on the left side. Posterior tibial pulses are 2+ on the right side and 2+ on the left side. Pulmonary:      Effort: Pulmonary effort is normal.      Breath sounds: Normal breath sounds. Skin:     General: Skin is warm. Capillary Refill: Capillary refill takes less than 2 seconds. Neurological:      Mental Status: He is alert and oriented to person, place, and time. Sensory: Sensation is intact. No sensory deficit.    Psychiatric:         Mood and Affect: Mood normal.         MSK:  -Pain on palpation still mildly present to the second interdigital space bilaterally, however much improved since the patient's last visit  -Magaly click test negative on today's exam bilaterally to the second intermetatarsal space  -No gross deformities noted   -MMT is 5/5 to all muscle compartments of the lower extremity  -Ankle dorsiflexion <10 degrees with knee extended and knee flexed b/l     Derm:  -No lesions, abrasions, or open wounds noted  -No noted interdigital maceration, peeling, malodor  -No callus formation noted on exam

## 2024-05-14 ENCOUNTER — OFFICE VISIT (OUTPATIENT)
Dept: FAMILY MEDICINE CLINIC | Facility: CLINIC | Age: 54
End: 2024-05-14
Payer: COMMERCIAL

## 2024-05-14 ENCOUNTER — APPOINTMENT (OUTPATIENT)
Dept: RADIOLOGY | Facility: CLINIC | Age: 54
End: 2024-05-14
Payer: COMMERCIAL

## 2024-05-14 VITALS
DIASTOLIC BLOOD PRESSURE: 88 MMHG | BODY MASS INDEX: 49.34 KG/M2 | TEMPERATURE: 98 F | WEIGHT: 315 LBS | HEART RATE: 60 BPM | SYSTOLIC BLOOD PRESSURE: 148 MMHG | RESPIRATION RATE: 18 BRPM
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
## 2024-05-14 DIAGNOSIS — R07.81 RIB PAIN ON RIGHT SIDE: ICD-10-CM
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
## 2024-05-14 DIAGNOSIS — M54.6 ACUTE BILATERAL THORACIC BACK PAIN: ICD-10-CM

## 2024-05-14 PROCEDURE — 99214 OFFICE O/P EST MOD 30 MIN: CPT | Performed by: FAMILY MEDICINE

## 2024-05-14 PROCEDURE — 71101 X-RAY EXAM UNILAT RIBS/CHEST: CPT

## 2024-05-14 RX ORDER — CYCLOBENZAPRINE HCL 10 MG
10 TABLET ORAL
Qty: 30 TABLET | Refills: 0 | Status: SHIPPED | OUTPATIENT
Start: 2024-05-14

## 2024-05-14 RX ORDER — NAPROXEN 500 MG/1
500 TABLET ORAL 2 TIMES DAILY WITH MEALS
Qty: 60 TABLET | Refills: 0 | Status: SHIPPED | OUTPATIENT
Start: 2024-05-14
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
## 2025-03-13 DIAGNOSIS — Z00.6 ENCOUNTER FOR EXAMINATION FOR NORMAL COMPARISON OR CONTROL IN CLINICAL RESEARCH PROGRAM: ICD-10-CM

## 2025-08-05 DIAGNOSIS — R73.03 PREDIABETES: ICD-10-CM

## 2025-08-05 DIAGNOSIS — Z12.5 ENCOUNTER FOR SCREENING FOR MALIGNANT NEOPLASM OF PROSTATE: ICD-10-CM

## 2025-08-05 DIAGNOSIS — Z13.89 SCREENING FOR CARDIOVASCULAR, RESPIRATORY, AND GENITOURINARY DISEASES: ICD-10-CM

## 2025-08-05 DIAGNOSIS — Z13.83 SCREENING FOR CARDIOVASCULAR, RESPIRATORY, AND GENITOURINARY DISEASES: ICD-10-CM

## 2025-08-05 DIAGNOSIS — N42.9 DISORDER OF PROSTATE, UNSPECIFIED: ICD-10-CM

## 2025-08-05 DIAGNOSIS — Z13.6 SCREENING FOR CARDIOVASCULAR, RESPIRATORY, AND GENITOURINARY DISEASES: ICD-10-CM

## 2025-08-05 DIAGNOSIS — Z13.1 SCREENING FOR DIABETES MELLITUS (DM): ICD-10-CM

## 2025-08-17 LAB
ALBUMIN SERPL-MCNC: 4.4 G/DL (ref 3.8–4.9)
ALP SERPL-CCNC: 79 IU/L (ref 44–121)
ALT SERPL-CCNC: 45 IU/L (ref 0–44)
AST SERPL-CCNC: 27 IU/L (ref 0–40)
BILIRUB SERPL-MCNC: 0.4 MG/DL (ref 0–1.2)
BUN SERPL-MCNC: 16 MG/DL (ref 6–24)
BUN/CREAT SERPL: 15 (ref 9–20)
CALCIUM SERPL-MCNC: 9 MG/DL (ref 8.7–10.2)
CHLORIDE SERPL-SCNC: 103 MMOL/L (ref 96–106)
CHOLEST SERPL-MCNC: 176 MG/DL (ref 100–199)
CO2 SERPL-SCNC: 19 MMOL/L (ref 20–29)
CREAT SERPL-MCNC: 1.06 MG/DL (ref 0.76–1.27)
EGFR: 84 ML/MIN/1.73
EST. AVERAGE GLUCOSE BLD GHB EST-MCNC: 137 MG/DL
GLOBULIN SER-MCNC: 2.7 G/DL (ref 1.5–4.5)
GLUCOSE SERPL-MCNC: 129 MG/DL (ref 70–99)
HBA1C MFR BLD: 6.4 % (ref 4.8–5.6)
HDLC SERPL-MCNC: 38 MG/DL
LDLC SERPL CALC-MCNC: 113 MG/DL (ref 0–99)
LDLC/HDLC SERPL: 3 RATIO (ref 0–3.6)
MICRODELETION SYND BLD/T FISH: NORMAL
POTASSIUM SERPL-SCNC: 4 MMOL/L (ref 3.5–5.2)
PROT SERPL-MCNC: 7.1 G/DL (ref 6–8.5)
PSA SERPL-MCNC: 0.4 NG/ML (ref 0–4)
SL AMB REFLEX CRITERIA: NORMAL
SL AMB VLDL CHOLESTEROL CALC: 25 MG/DL (ref 5–40)
SODIUM SERPL-SCNC: 139 MMOL/L (ref 134–144)
TRIGL SERPL-MCNC: 137 MG/DL (ref 0–149)